# Patient Record
Sex: MALE | Race: WHITE
[De-identification: names, ages, dates, MRNs, and addresses within clinical notes are randomized per-mention and may not be internally consistent; named-entity substitution may affect disease eponyms.]

---

## 2018-11-02 ENCOUNTER — HOSPITAL ENCOUNTER (OUTPATIENT)
Dept: HOSPITAL 47 - LABPAT | Age: 68
Discharge: HOME | End: 2018-11-02
Attending: ORTHOPAEDIC SURGERY
Payer: MEDICARE

## 2018-11-02 DIAGNOSIS — Z01.812: Primary | ICD-10-CM

## 2018-11-02 LAB
ALBUMIN SERPL-MCNC: 3.9 G/DL (ref 3.5–5)
ALP SERPL-CCNC: 65 U/L (ref 38–126)
ALT SERPL-CCNC: 28 U/L (ref 21–72)
ANION GAP SERPL CALC-SCNC: 8 MMOL/L
APTT BLD: 23.6 SEC (ref 22–30)
AST SERPL-CCNC: 23 U/L (ref 17–59)
BUN SERPL-SCNC: 15 MG/DL (ref 9–20)
CALCIUM SPEC-MCNC: 9.4 MG/DL (ref 8.4–10.2)
CHLORIDE SERPL-SCNC: 107 MMOL/L (ref 98–107)
CO2 SERPL-SCNC: 27 MMOL/L (ref 22–30)
ERYTHROCYTE [DISTWIDTH] IN BLOOD BY AUTOMATED COUNT: 4.59 M/UL (ref 4.3–5.9)
ERYTHROCYTE [DISTWIDTH] IN BLOOD: 13.2 % (ref 11.5–15.5)
GLUCOSE SERPL-MCNC: 96 MG/DL (ref 74–99)
HCT VFR BLD AUTO: 43.9 % (ref 39–53)
HGB BLD-MCNC: 15.3 GM/DL (ref 13–17.5)
INR PPP: 1.1 (ref ?–1.2)
MCH RBC QN AUTO: 33.4 PG (ref 25–35)
MCHC RBC AUTO-ENTMCNC: 34.9 G/DL (ref 31–37)
MCV RBC AUTO: 95.8 FL (ref 80–100)
PH UR: 5.5 [PH] (ref 5–8)
PLATELET # BLD AUTO: 207 K/UL (ref 150–450)
POTASSIUM SERPL-SCNC: 4.4 MMOL/L (ref 3.5–5.1)
PROT SERPL-MCNC: 6.2 G/DL (ref 6.3–8.2)
PT BLD: 10.5 SEC (ref 9–12)
SODIUM SERPL-SCNC: 142 MMOL/L (ref 137–145)
SP GR UR: 1.02 (ref 1–1.03)
UROBILINOGEN UR QL STRIP: <2 MG/DL (ref ?–2)
WBC # BLD AUTO: 6.7 K/UL (ref 3.8–10.6)

## 2018-11-02 PROCEDURE — 85027 COMPLETE CBC AUTOMATED: CPT

## 2018-11-02 PROCEDURE — 85610 PROTHROMBIN TIME: CPT

## 2018-11-02 PROCEDURE — 81003 URINALYSIS AUTO W/O SCOPE: CPT

## 2018-11-02 PROCEDURE — 85730 THROMBOPLASTIN TIME PARTIAL: CPT

## 2018-11-02 PROCEDURE — 80053 COMPREHEN METABOLIC PANEL: CPT

## 2018-11-02 PROCEDURE — 87070 CULTURE OTHR SPECIMN AEROBIC: CPT

## 2018-11-02 PROCEDURE — 36415 COLL VENOUS BLD VENIPUNCTURE: CPT

## 2018-11-06 ENCOUNTER — HOSPITAL ENCOUNTER (INPATIENT)
Dept: HOSPITAL 47 - 2ORMAIN | Age: 68
LOS: 2 days | Discharge: HOME HEALTH | DRG: 470 | End: 2018-11-08
Attending: ORTHOPAEDIC SURGERY | Admitting: ORTHOPAEDIC SURGERY
Payer: MEDICARE

## 2018-11-06 VITALS — BODY MASS INDEX: 29.5 KG/M2

## 2018-11-06 DIAGNOSIS — Z87.442: ICD-10-CM

## 2018-11-06 DIAGNOSIS — Z95.1: ICD-10-CM

## 2018-11-06 DIAGNOSIS — H40.9: ICD-10-CM

## 2018-11-06 DIAGNOSIS — G47.30: ICD-10-CM

## 2018-11-06 DIAGNOSIS — Z79.82: ICD-10-CM

## 2018-11-06 DIAGNOSIS — I25.10: ICD-10-CM

## 2018-11-06 DIAGNOSIS — M25.761: ICD-10-CM

## 2018-11-06 DIAGNOSIS — Z79.899: ICD-10-CM

## 2018-11-06 DIAGNOSIS — M17.11: Primary | ICD-10-CM

## 2018-11-06 DIAGNOSIS — I10: ICD-10-CM

## 2018-11-06 DIAGNOSIS — E78.5: ICD-10-CM

## 2018-11-06 DIAGNOSIS — E11.9: ICD-10-CM

## 2018-11-06 DIAGNOSIS — M48.00: ICD-10-CM

## 2018-11-06 PROCEDURE — 0SRC069 REPLACEMENT OF RIGHT KNEE JOINT WITH OXIDIZED ZIRCONIUM ON POLYETHYLENE SYNTHETIC SUBSTITUTE, CEMENTED, OPEN APPROACH: ICD-10-PCS

## 2018-11-06 PROCEDURE — 88300 SURGICAL PATH GROSS: CPT

## 2018-11-06 PROCEDURE — 85025 COMPLETE CBC W/AUTO DIFF WBC: CPT

## 2018-11-06 RX ADMIN — FENTANYL CITRATE PRN MCG: 50 INJECTION, SOLUTION INTRAMUSCULAR; INTRAVENOUS at 17:42

## 2018-11-06 RX ADMIN — FENTANYL CITRATE PRN MCG: 50 INJECTION, SOLUTION INTRAMUSCULAR; INTRAVENOUS at 17:33

## 2018-11-06 RX ADMIN — FENTANYL CITRATE PRN MCG: 50 INJECTION, SOLUTION INTRAMUSCULAR; INTRAVENOUS at 16:56

## 2018-11-06 RX ADMIN — ASPIRIN 325 MG ORAL TABLET SCH MG: 325 PILL ORAL at 21:58

## 2018-11-06 RX ADMIN — DOCUSATE SODIUM AND SENNOSIDES SCH EACH: 50; 8.6 TABLET ORAL at 21:58

## 2018-11-06 RX ADMIN — POTASSIUM CHLORIDE SCH MLS: 14.9 INJECTION, SOLUTION INTRAVENOUS at 12:40

## 2018-11-06 RX ADMIN — CEFAZOLIN SCH MLS: 330 INJECTION, POWDER, FOR SOLUTION INTRAMUSCULAR; INTRAVENOUS at 17:51

## 2018-11-06 RX ADMIN — FENTANYL CITRATE PRN MCG: 50 INJECTION, SOLUTION INTRAMUSCULAR; INTRAVENOUS at 17:12

## 2018-11-06 RX ADMIN — CEFAZOLIN SCH GM: 10 INJECTION, POWDER, FOR SOLUTION INTRAVENOUS at 22:31

## 2018-11-06 NOTE — P.ONQ
Anesthesiology Proc Note - PNB





- Peripheral Nerve Block Performed


  ** Right Adductor Canal Infusion


Time Out Performed: Yes


Procedure Start Time: 13:09


Procedure Stop Time: 13:17


Indication: Acute Post-Operative Pain, Requested by physician


Sedation Type: Sedate with meaningful contact maintained


Preparation: Sterile Dressing


Position: Supine


Catheter: Indwelling


Needle Types: On-Q


Needle Size: 100mm (4")


Needle Gauge: 21


Technique: Ultrasound


Injectate: 0.5% Ropivacaine (see comment for volume) (ropi .5% 20cc)


Blood Aspirated: No


Pain Paresthesia on Injection Noted: No


Resistance on Injection: Normal


Events: Uneventful and Well Tolerated

## 2018-11-06 NOTE — P.OP
Date of Procedure: 11/06/18


Preoperative Diagnosis: 


Severe osteoarthritis right knee


Postoperative Diagnosis: 


Severe osteoarthritis right knee


Procedure(s) Performed: 


Right total knee arthroplasty


Implants: 


Smith and Nephew Journey II CR Oxinium cruciate retaining femoral component 

size 8, right


Smith & Nephew Journey right nonporous tibial baseplate size 7


Smith & Nephew Journey II, XLPE CR articular insert, size 9 mm, Size 7-8 right


Smith & Nephew Journey BCS resurfacing round patellar component, 32 mm


All components were cemented using Palacos R bone cement..


The articulation is Oxinium on polyethylene.


Anesthesia: spinal


Surgeon: Josue Morris


Assistant #1: Lindsay Marlow


Estimated Blood Loss (ml): 50


Pathology: other (Bone and cartilage)


Condition: stable


Disposition: PACU


Indications for Procedure: 


After failure of conservative treatment we discussed the surgical and 

nonsurgical treatment options at length.  Patient wishes to proceed with a 

total knee arthroplasty.  Complications specific to this procedure were 

discussed at length, including but not limited to infection, bleeding, stiffness

, and nerve injury.  Patient is aware of all these complications and informed 

consent was obtained


Operative Findings: 


The operative findings are consistent with severe osteoarthritis of the right 

knee


Description of Procedure: 


Patient was seen in the preoperative area consent was reviewed and operative 

site was marked with a skin marker.  An adductor canal pain catheter was placed 

by anesthesia in the preoperative area.  Patient was then brought to the 

operating room and given preoperative antibiotics intravenously. A spinal 

anesthetic was administered by the anesthesia department.  A tourniquet was 

placed on the upper thigh and the lower extremity was prepped and draped in 

usual sterile fashion.  A gram of transexamic acid was given.  A universal 

timeout was then performed which confirmed the patient's name, surgical site, 

ALLERGIES, and consent.





The lower extremity was then exsanguinated and tourniquet was inflated to 250 

mmHg.  A standard and anterior midline approach to the knee was performed.  The 

skin and subcutaneous tissue was dissected down to the patellar tendon.  A 

medial parapatellar arthrotomy was then performed.  The knee was then extended, 

the patellar was everted, and the knee was again flexed.  Anterior horns of 

both menisci were excised, and a release was performed to the posterior medial 

aspect of the knee.  On gross visual inspection, there was complete loss of 

articular cartilage in the medial and patellofemoral joint spaces.  There was 

also significant cartilage damage in the lateral compartment.  There were 

multiple periarticular osteophytes which were then removed with a Ronguer.  The 

femoral canal was then opened with the appropriate drill, and the 

intramedullary femoral cutting guide was then placed and set for 5 of valgus.  

The distal femoral cutting block was then pinned in place, and the distal femur 

was then cut.  The cutting block was then removed and the cut was checked for 

flatness.  Next, the sizing guide was then placed and set for 3 external 

rotation based off of the epicondylar axis and Whitesides line.  After the 

femur was sized, the appropriate 4-in-1 cutting block was then pinned in place.

  The anterior condyles were cut without notching.  The posterior and chamfer 

cuts were performed while protecting the collateral ligaments.  The cutting 

block was then removed, and the femoral canal was plugged with autologous bone.





Attention was then directed to the tibia.  The remaining ACL was removed with a 

Ronguer, and the tibia was then gently subluxed forward with a large bent knee 

retractor.  Any remaining menisci was excised.  The posterior lateral corner 

was cauterized in order to cauterize the lateral geniculate artery.  The extra 

medullary tibial cutting guide was then placed, set for the appropriate rotation

, slope, and depth of resection.  The proximal tibia cutting guide was then 

pinned in place.  Proximal tibia was then cut and sized.  Next trials were then 

placed with the appropriate-sized insert.  The knee was able to fully extend 

and flex to 130 and was stable throughout all range of motion.  The knee was 

then extended, patella everted.  Patella was then measured, and then using an 

osteotomy guide, the patella was cut at the appropriate level.  The patella was 

then measured and drilled and the patella trial was then placed.  The knee was 

then taken through range of motion with the patella trial and the patella 

tracked normally.  The knee was then extended patella trial was then removed 

and the patella was everted.  Knee was then flexed and lug holes were drilled 

through the femoral trial and the femoral trial was then removed.  The tibial 

was then exposed, and the tibial broach guide was then pinned in place after it 

was set for the appropriate rotation to allow for the most coverage without 

overhang.  The tibia was then reamed and broached.





The cut surfaces of bone were then irrigated with pulsatile lavage.  The 

posterior structures were injected with the ropivacaine solution.  The knee was 

also irrigated with Irrisept solution.  The components were then opened, the 

cement was mixed, and the components were then cemented in place.  The cement 

was allowed to harden with the knee in full extension.  While the cement was 

hardening, the remaining soft tissues were then injected with a ropivacaine 

solution, which consisted of 246.25 mg of ropivacaine, 0.5 mg of epinephrine, 

30 mg of Toradol, 80 g of clonidine, and 48.45 mL of sterile water, for a 

total of 100 mL of fluid injected. After the cemented hardened.  The tourniquet 

was released, and hemostasis was obtained.  A second gram of transexamic acid 

was given.  The knee was again irrigated.  The knee was again taken through 

range of motion and found to be stable throughout all range of motion of 0-130

, and the patella tracked normally.  The fascia was then closed with #2 strata 

fix suture.  The subcutaneous tissue was closed with 3-0 Vicryl and 3-0 strata 

fix.  Dermabond glue was used for the skin and placed with the knee in flexion. 

The patient was placed in a sterile silver dressing.  Patient was then 

transferred to recovery room in stable condition.





The assistant MAYELA De Santiago was required due the complexity surgery and the 

need for a skilled surgical assistant.  She assisted in positioning, draping, 

retraction, and closure of the wound.

## 2018-11-06 NOTE — XR
PROCEDURE: XR knee limited RT 2V

 

DATE AND TIME: 11/6/2018 5:05 PM

 

CLINICAL INDICATION: [facility]

Evaluation for Postop abnormality and alignment

 

TECHNIQUE: Department protocol. 2V

 

COMPARISON: None

 

FINDINGS: TKR is well-seated with anatomic positioning and alignment. No unexpected postoperative fin
dings.

 

IMPRESSION:

Post procedure.

## 2018-11-07 LAB
BASOPHILS # BLD AUTO: 0 K/UL (ref 0–0.2)
BASOPHILS NFR BLD AUTO: 0 %
EOSINOPHIL # BLD AUTO: 0.1 K/UL (ref 0–0.7)
EOSINOPHIL NFR BLD AUTO: 1 %
ERYTHROCYTE [DISTWIDTH] IN BLOOD BY AUTOMATED COUNT: 4.17 M/UL (ref 4.3–5.9)
ERYTHROCYTE [DISTWIDTH] IN BLOOD: 13.2 % (ref 11.5–15.5)
HCT VFR BLD AUTO: 40.6 % (ref 39–53)
HGB BLD-MCNC: 13.7 GM/DL (ref 13–17.5)
LYMPHOCYTES # SPEC AUTO: 1 K/UL (ref 1–4.8)
LYMPHOCYTES NFR SPEC AUTO: 10 %
MCH RBC QN AUTO: 32.9 PG (ref 25–35)
MCHC RBC AUTO-ENTMCNC: 33.8 G/DL (ref 31–37)
MCV RBC AUTO: 97.4 FL (ref 80–100)
MONOCYTES # BLD AUTO: 0.5 K/UL (ref 0–1)
MONOCYTES NFR BLD AUTO: 5 %
NEUTROPHILS # BLD AUTO: 8.8 K/UL (ref 1.3–7.7)
NEUTROPHILS NFR BLD AUTO: 83 %
PLATELET # BLD AUTO: 199 K/UL (ref 150–450)
WBC # BLD AUTO: 10.5 K/UL (ref 3.8–10.6)

## 2018-11-07 RX ADMIN — DOCUSATE SODIUM AND SENNOSIDES SCH EACH: 50; 8.6 TABLET ORAL at 23:03

## 2018-11-07 RX ADMIN — LISINOPRIL SCH MG: 20 TABLET ORAL at 07:57

## 2018-11-07 RX ADMIN — HYDROCODONE BITARTRATE AND ACETAMINOPHEN PRN EACH: 5; 325 TABLET ORAL at 09:44

## 2018-11-07 RX ADMIN — DOXAZOSIN MESYLATE SCH MG: 2 TABLET ORAL at 23:03

## 2018-11-07 RX ADMIN — HYDROCODONE BITARTRATE AND ACETAMINOPHEN PRN EACH: 5; 325 TABLET ORAL at 04:29

## 2018-11-07 RX ADMIN — HYDROCODONE BITARTRATE AND ACETAMINOPHEN PRN EACH: 5; 325 TABLET ORAL at 15:29

## 2018-11-07 RX ADMIN — HYDROCODONE BITARTRATE AND ACETAMINOPHEN PRN EACH: 5; 325 TABLET ORAL at 23:02

## 2018-11-07 RX ADMIN — DOXAZOSIN MESYLATE SCH MG: 2 TABLET ORAL at 08:39

## 2018-11-07 RX ADMIN — POTASSIUM CHLORIDE SCH: 14.9 INJECTION, SOLUTION INTRAVENOUS at 05:09

## 2018-11-07 RX ADMIN — ASPIRIN 325 MG ORAL TABLET SCH MG: 325 PILL ORAL at 23:03

## 2018-11-07 RX ADMIN — HYDROCHLOROTHIAZIDE SCH MG: 12.5 CAPSULE ORAL at 07:57

## 2018-11-07 RX ADMIN — LISINOPRIL SCH MG: 20 TABLET ORAL at 23:03

## 2018-11-07 RX ADMIN — CEFAZOLIN SCH GM: 10 INJECTION, POWDER, FOR SOLUTION INTRAVENOUS at 08:39

## 2018-11-07 RX ADMIN — MELOXICAM SCH MG: 7.5 TABLET ORAL at 07:57

## 2018-11-07 RX ADMIN — ASPIRIN 325 MG ORAL TABLET SCH MG: 325 PILL ORAL at 07:58

## 2018-11-07 RX ADMIN — CEFAZOLIN SCH: 330 INJECTION, POWDER, FOR SOLUTION INTRAMUSCULAR; INTRAVENOUS at 04:06

## 2018-11-07 RX ADMIN — CEFAZOLIN SCH: 330 INJECTION, POWDER, FOR SOLUTION INTRAMUSCULAR; INTRAVENOUS at 18:01

## 2018-11-07 NOTE — P.PN
Progress Note - Text


Progress Note Date: 11/07/18





The patient is status post[ 1] adductor canal catheter placement.  The catheter 

was placed for postoperative pain control, status post total [Right Knee] 

arthroplasty.  Ropivacaine 0.2% is infusing at[5] mLs per hour.  The patient 

has no complaints  of[ right] lower extremity numbness or weakness.  Patient's 

VAS score is[ 2]-10.   Assessment: Patient's adductor canal catheter is in 

place and working appropriately.  Plan: continue infusion and adjust it as 

needed.

## 2018-11-07 NOTE — CONS
CONSULTATION



DATE OF CONSULTATION:

11/06/2018.



REASON FOR CONSULTATION:

Medical management requested by Dr. Morris.



CONSULTATION:

This is a very pleasant 68-year-old patient of Dr. Gilbert Enciso.  The patient has

undergone a right total knee arthroplasty.  Post procedure, had some nausea.  Other

stable medical conditions include osteoarthritis, hypertension.  No chest pain or short

of breath.  Denies any cardiac history.  Somewhat tired post procedure.



REVIEW OF SYSTEMS:

CONSTITUTIONAL:  Tired.

HEENT:  None.

RESPIRATORY none.

GASTROINTESTINAL:  Nausea.

GENITOURINARY: None.

MUSCULOSKELETAL: Arthritic pain in different joints.

DERMATOLOGICAL, HEMATOLOGIC, LYMPHATIC: none.  PSYCHIATRY none.

NEUROLOGICAL none.



PAST MEDICAL HISTORY:

Osteoarthritis, hypertension.



PAST SURGICAL HISTORY:

Back surgery, cervical fusion.



SOCIAL HISTORY:

.  Smoked a pack a day for 30 years.  Stopped in 2003.  Occasional marijuana.

Retired from hospital management.



FAMILY HISTORY:

Of cancer, type unknown.



HOME MEDICATIONS:

1. Norvasc 10 mg a day.

2. Altace 10 mg b.i.d.

3. Naproxen 500 mg p.o. b.i.d.

4. Hydrochlorothiazide 12.5 p.o. daily.

5. Cardura 2 mg p.o. b.i.d.

6. Aspirin 81 mg p.o. daily.



ALLERGIES:

None.



PHYSICAL EXAMINATION:

VITAL SIGNS: Temperature 98.5, pulse 72, respirations 17, blood pressure 147/92, pulse

ox 95 percent on 2 L.

GENERAL APPEARANCE:  Average build, lying in bed, tired appearing.

EYES: Pupils are equal. Conjunctivae normal.

HEENT:  External appearance of nose and ears normal. Oral cavity normal.

NECK: JVD not raised.  Mass not palpable.

RESPIRATORY:  Effort normal.

LUNGS:  Slightly decreased breath sounds.

CARDIOVASCULAR:  1st and 2nd sounds normal.  No edema.

ABDOMEN:  Soft, nontender.  Liver and spleen not palpable.  LYMPHATICS:  No lymph nodes

palpable in the neck and axillae.

PSYCHIATRY: Alert and oriented x3. Mood and affect normal.  NEUROLOGICAL:  Pupils

equal. Cranial nerves grossly intact. Power and sensation grossly intact.

MUSCULOSKELETAL:  Evidence of osteoarthritis especially in the hands.  Dressing over

the right knee.



INVESTIGATIONS:

Blood work from 11/02/2018 shows a white count of 6.7, hemoglobin 15.3, potassium 4.4.

BUN and creatinine is normal.



ASSESSMENT:

1. Right total knee arthroplasty.

2. Primary osteoarthritis.

3. Postop nausea probably side effect of pain medication.

4. Essential hypertension.



PLAN:

Patient is on aspirin 3-5 twice a day.  DVT prophylaxis.  Pain medications in place.

Also getting gentle hydration.  Venodyne boots for DVT prophylaxis also.  The patient

will get antiemetics.  Care was discussed with the patient.  Questions were answered.



Thank you, Dr. Morris.



Copy to Dr. Enciso.





MMFELIPEL / DEBORAHN: [id number] / Job#: [id number]

## 2018-11-08 VITALS
TEMPERATURE: 98 F | DIASTOLIC BLOOD PRESSURE: 88 MMHG | SYSTOLIC BLOOD PRESSURE: 149 MMHG | HEART RATE: 55 BPM | RESPIRATION RATE: 12 BRPM

## 2018-11-08 RX ADMIN — HYDROCODONE BITARTRATE AND ACETAMINOPHEN PRN EACH: 5; 325 TABLET ORAL at 05:14

## 2018-11-08 RX ADMIN — HYDROCODONE BITARTRATE AND ACETAMINOPHEN PRN EACH: 5; 325 TABLET ORAL at 16:37

## 2018-11-08 RX ADMIN — LISINOPRIL SCH MG: 20 TABLET ORAL at 08:33

## 2018-11-08 RX ADMIN — DOXAZOSIN MESYLATE SCH MG: 2 TABLET ORAL at 08:33

## 2018-11-08 RX ADMIN — POTASSIUM CHLORIDE SCH: 14.9 INJECTION, SOLUTION INTRAVENOUS at 05:08

## 2018-11-08 RX ADMIN — CEFAZOLIN SCH: 330 INJECTION, POWDER, FOR SOLUTION INTRAMUSCULAR; INTRAVENOUS at 05:08

## 2018-11-08 RX ADMIN — ASPIRIN 325 MG ORAL TABLET SCH MG: 325 PILL ORAL at 08:33

## 2018-11-08 RX ADMIN — MELOXICAM SCH MG: 7.5 TABLET ORAL at 08:34

## 2018-11-08 RX ADMIN — HYDROCHLOROTHIAZIDE SCH MG: 12.5 CAPSULE ORAL at 08:34

## 2018-11-08 NOTE — P.PN
Subjective





this is a pleasant 69 yo M with pmh of Hypertension who presents for his right 

knee degenerative disease. he is status post right total knee arthroplasaty  

when i saw the pt he was sitting in bed, his pain is controlled, pt denies to 

me chest pain , no dyspnea, no change in urine or bowel habits, no fever, no 

nausea or vomiting. pt tolerated his meal. his vitals looks stable and he is 

saturating 93% on room air,wbc: 10.5K, Hemoglobin: 13.7, Platelets: 199








11/08/2018


Patient is seen and examined by me at bedside.  Patient is doing well after 

surgery.  Patient denies chest pain or dyspnea.  He says his pain is 

controlled.  Didn't complain about bowel movement and urine output.  Patient 

was instructed to follow up with his PCP in one week and he stated that he has 

an appointment with his PCP by the end of November in 2-1/2 weeks.





Objective





- Vital Signs


Vital signs: 


 Vital Signs











Temp  98.1 F   11/08/18 08:07


 


Pulse  74   11/08/18 08:07


 


Resp  16   11/08/18 08:07


 


BP  154/92   11/08/18 08:07


 


Pulse Ox  94 L  11/08/18 08:07








 Intake & Output











 11/07/18 11/08/18 11/08/18





 18:59 06:59 18:59


 


Intake Total 500 1080 


 


Output Total  275 


 


Balance 500 805 


 


Intake:   


 


  Intake, IV Titration 500  





  Amount   


 


    Sodium Chloride 0.9% 1, 500  





    000 ml @ 75 mls/hr IV .   





    [id number] [facility] Rx#:[id number]   


 


  Oral  1080 


 


Output:   


 


  Urine  275 


 


Other:   


 


  Voiding Method  Toilet 





  Urinal 


 


  # Voids 3  














- Exam





GENERAL: The patient is alert and oriented x3, not in any acute distress. Well 

developed, well nourished. 


HEENT: Pupils are round and equally reacting to light. EOMI. No scleral 

icterus. No conjunctival pallor. Normocephalic, atraumatic. No pharyngeal 

erythema. No thyromegaly. 


CARDIOVASCULAR: S1 and S2 present. No murmurs, rubs, or gallops. 


PULMONARY: Chest is clear to auscultation, no wheezing or crackles. 


ABDOMEN: Soft, nontender, nondistended, normoactive bowel sounds. No palpable 

organomegaly.  


MUSCULOSKELETAL: No joint swelling or deformity.


EXTREMITIES: No cyanosis, clubbing, or pedal edema. surgical wound looks clean 

with no surrounding cellulitis, further exam is deferred to the primary 

surgical team.


NEUROLOGICAL: Gross neurological examination did not reveal any focal deficits. 


SKIN: No rashes.





- Labs


CBC & Chem 7: 


 11/07/18 07:01








Assessment and Plan


Assessment: 








This is a pleasant 72 yo M with pmh of Hypertension , hyperlipidemia, 

steoarthritis, Diabetes Mellitus, kidney stones, possible sleep apnea, spinal 

stenois, glaucoma, coronary artery disease status post CABG. he presents for 

his left knee degenerative disease. he is status post left total knee 

arthroplasaty  w


. when i saw the pt he was sitting in bed, his pain is controlled ,, pt denies 

to me chest pain , no dyspnea, no change in urine or bowel habits, no fever, no 

nausea or vomiting. pt tolerated his meal. his vitals looks stable and he is 

saturating 98% on room air,wbc: 9.1K, Hemoglobin: 9.6, Platelets: 248. 

hemoglobin A1c 8.1%





Plan: 


continue with the same treatment , continue with symptomatic treatment , resume 

home medication , monitor lytes and vitals including glucose , c/w iv fluids, 

cardiology consult is appreciated. infectious disease consult is appreciated . c

/w same antibioitc . GI and DVT prophylaxis , further recommendation based upon 

pt clinical course and progress


DVT prophylaxis subcutaneous heparin


GI prophylaxis Pepcid


Patient was instructed to follow up with his PCP in one week after discharge.  

Patient states he has an appointment with his PCP by the end of November and 2 

and half weeks 


Prognosis is guarded





Thank you for consulting us, please feel free to contact us for any further 

question or concerns.

## 2018-11-08 NOTE — P.PN
Progress Note - Text





Anesthesia POD 2.  Patient is status post right TKR under spinal anesthesia 

with a right adductor canal catheter placed for postoperative pain relief.  

With ropivacaine 0.2% running at 10 cc's per hour, the patient's VAS is (1, 3).

  Catheter site is clean dry and intact.

## 2018-12-10 ENCOUNTER — HOSPITAL ENCOUNTER (OUTPATIENT)
Dept: HOSPITAL 47 - LABWHC1 | Age: 68
End: 2018-12-10
Attending: ORTHOPAEDIC SURGERY
Payer: MEDICARE

## 2018-12-10 DIAGNOSIS — Z96.651: ICD-10-CM

## 2018-12-10 DIAGNOSIS — M25.561: Primary | ICD-10-CM

## 2018-12-10 LAB
BASOPHILS # BLD AUTO: 0 K/UL (ref 0–0.2)
BASOPHILS NFR BLD AUTO: 0 %
CELL CNT PNL FLD: 100
DEPRECATED POLYS # FLD: 97 %
EOSINOPHIL # BLD AUTO: 0.3 K/UL (ref 0–0.7)
EOSINOPHIL NFR BLD AUTO: 1 %
ERYTHROCYTE [DISTWIDTH] IN BLOOD BY AUTOMATED COUNT: 4.31 M/UL (ref 4.3–5.9)
ERYTHROCYTE [DISTWIDTH] IN BLOOD: 13.1 % (ref 11.5–15.5)
HCT VFR BLD AUTO: 41.6 % (ref 39–53)
HGB BLD-MCNC: 13.9 GM/DL (ref 13–17.5)
LYMPHOCYTES # SPEC AUTO: 0.9 K/UL (ref 1–4.8)
LYMPHOCYTES NFR SPEC AUTO: 4 %
MCH RBC QN AUTO: 32.3 PG (ref 25–35)
MCHC RBC AUTO-ENTMCNC: 33.5 G/DL (ref 31–37)
MCV RBC AUTO: 96.5 FL (ref 80–100)
MONOCYTES # BLD AUTO: 0.8 K/UL (ref 0–1)
MONOCYTES NFR BLD AUTO: 4 %
MONONUC CELLS # FLD: 1 %
NEUTROPHILS # BLD AUTO: 18.9 K/UL (ref 1.3–7.7)
NEUTROPHILS NFR BLD AUTO: 90 %
NUC CELL # FLD: (no result) /UL
PLATELET # BLD AUTO: 164 K/UL (ref 150–450)
WBC # BLD AUTO: 21.1 K/UL (ref 3.8–10.6)

## 2018-12-10 PROCEDURE — 86140 C-REACTIVE PROTEIN: CPT

## 2018-12-10 PROCEDURE — 87075 CULTR BACTERIA EXCEPT BLOOD: CPT

## 2018-12-10 PROCEDURE — 85652 RBC SED RATE AUTOMATED: CPT

## 2018-12-10 PROCEDURE — 87070 CULTURE OTHR SPECIMN AEROBIC: CPT

## 2018-12-10 PROCEDURE — 85025 COMPLETE CBC W/AUTO DIFF WBC: CPT

## 2018-12-10 PROCEDURE — 87205 SMEAR GRAM STAIN: CPT

## 2018-12-10 PROCEDURE — 89060 EXAM SYNOVIAL FLUID CRYSTALS: CPT

## 2018-12-10 PROCEDURE — 36415 COLL VENOUS BLD VENIPUNCTURE: CPT

## 2018-12-10 PROCEDURE — 89050 BODY FLUID CELL COUNT: CPT

## 2018-12-11 ENCOUNTER — HOSPITAL ENCOUNTER (INPATIENT)
Dept: HOSPITAL 47 - ORWHC2ENDO | Age: 68
LOS: 3 days | Discharge: HOME HEALTH | DRG: 486 | End: 2018-12-14
Attending: ORTHOPAEDIC SURGERY | Admitting: ORTHOPAEDIC SURGERY
Payer: MEDICARE

## 2018-12-11 VITALS — BODY MASS INDEX: 29.8 KG/M2

## 2018-12-11 DIAGNOSIS — Z87.891: ICD-10-CM

## 2018-12-11 DIAGNOSIS — B95.8: ICD-10-CM

## 2018-12-11 DIAGNOSIS — I10: ICD-10-CM

## 2018-12-11 DIAGNOSIS — D62: ICD-10-CM

## 2018-12-11 DIAGNOSIS — M19.91: ICD-10-CM

## 2018-12-11 DIAGNOSIS — Z79.899: ICD-10-CM

## 2018-12-11 DIAGNOSIS — T84.53XA: Primary | ICD-10-CM

## 2018-12-11 DIAGNOSIS — Z79.82: ICD-10-CM

## 2018-12-11 DIAGNOSIS — E66.9: ICD-10-CM
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
## 2018-12-11 DIAGNOSIS — Z98.1: ICD-10-CM

## 2018-12-11 PROCEDURE — 77001 FLUOROGUIDE FOR VEIN DEVICE: CPT

## 2018-12-11 PROCEDURE — 0SPC09Z REMOVAL OF LINER FROM RIGHT KNEE JOINT, OPEN APPROACH: ICD-10-PCS

## 2018-12-11 PROCEDURE — 87205 SMEAR GRAM STAIN: CPT

## 2018-12-11 PROCEDURE — 94760 N-INVAS EAR/PLS OXIMETRY 1: CPT

## 2018-12-11 PROCEDURE — 0SUV09Z SUPPLEMENT RIGHT KNEE JOINT, TIBIAL SURFACE WITH LINER, OPEN APPROACH: ICD-10-PCS

## 2018-12-11 PROCEDURE — 87075 CULTR BACTERIA EXCEPT BLOOD: CPT

## 2018-12-11 PROCEDURE — 87077 CULTURE AEROBIC IDENTIFY: CPT

## 2018-12-11 PROCEDURE — 85025 COMPLETE CBC W/AUTO DIFF WBC: CPT

## 2018-12-11 PROCEDURE — 76937 US GUIDE VASCULAR ACCESS: CPT

## 2018-12-11 PROCEDURE — 80202 ASSAY OF VANCOMYCIN: CPT

## 2018-12-11 PROCEDURE — 36569 INSJ PICC 5 YR+ W/O IMAGING: CPT

## 2018-12-11 PROCEDURE — 80048 BASIC METABOLIC PNL TOTAL CA: CPT

## 2018-12-11 PROCEDURE — 87186 SC STD MICRODIL/AGAR DIL: CPT

## 2018-12-11 PROCEDURE — 85610 PROTHROMBIN TIME: CPT

## 2018-12-11 PROCEDURE — 87070 CULTURE OTHR SPECIMN AEROBIC: CPT

## 2018-12-11 RX ADMIN — HYDROCODONE BITARTRATE AND ACETAMINOPHEN PRN EACH: 7.5; 325 TABLET ORAL at 23:24

## 2018-12-11 RX ADMIN — DOCUSATE SODIUM AND SENNOSIDES SCH EACH: 50; 8.6 TABLET ORAL at 23:25

## 2018-12-11 RX ADMIN — HYDROMORPHONE HYDROCHLORIDE ONE MG: 1 INJECTION, SOLUTION INTRAMUSCULAR; INTRAVENOUS; SUBCUTANEOUS at 18:25

## 2018-12-11 RX ADMIN — ONDANSETRON ONE MG: 2 INJECTION INTRAMUSCULAR; INTRAVENOUS at 18:51

## 2018-12-11 RX ADMIN — MIDAZOLAM ONE MG: 1 INJECTION INTRAMUSCULAR; INTRAVENOUS at 18:31

## 2018-12-11 RX ADMIN — HYDROMORPHONE HYDROCHLORIDE ONE MG: 1 INJECTION, SOLUTION INTRAMUSCULAR; INTRAVENOUS; SUBCUTANEOUS at 18:05

## 2018-12-11 RX ADMIN — HYDROMORPHONE HYDROCHLORIDE ONE MG: 1 INJECTION, SOLUTION INTRAMUSCULAR; INTRAVENOUS; SUBCUTANEOUS at 18:11

## 2018-12-11 RX ADMIN — ONDANSETRON ONE MG: 2 INJECTION INTRAMUSCULAR; INTRAVENOUS at 14:17

## 2018-12-11 RX ADMIN — MIDAZOLAM ONE MG: 1 INJECTION INTRAMUSCULAR; INTRAVENOUS at 14:58

## 2018-12-11 RX ADMIN — MIDAZOLAM ONE MG: 1 INJECTION INTRAMUSCULAR; INTRAVENOUS at 18:16

## 2018-12-11 RX ADMIN — CEFAZOLIN SCH GM: 10 INJECTION, POWDER, FOR SOLUTION INTRAVENOUS at 23:24

## 2018-12-11 RX ADMIN — ASPIRIN 325 MG ORAL TABLET SCH MG: 325 PILL ORAL at 23:24

## 2018-12-11 RX ADMIN — HYDROMORPHONE HYDROCHLORIDE ONE MG: 1 INJECTION, SOLUTION INTRAMUSCULAR; INTRAVENOUS; SUBCUTANEOUS at 18:56

## 2018-12-11 NOTE — P.HPOR
History of Present Illness


H&P Date: 12/11/18


This is a 68-year-old male who is status post right total knee arthroplasty on 

11/06/2018. The patient presented as an outpatient for follow up on 12/10/2018 

and complained of swelling and mild drainage in the right knee. The patient's 

knee was aspirated and fluid was sent to the lab for analysis and culture. The 

patient was scheduled for an incision and drainage of the right knee and the 

patient was sent for routine blood work. Patient denies fever, but did admit to 

chills. Patient denies any numbness, weakness, tingling, abdominal pain, 

shortness of breath or chest pain.








Review of Systems


See HPI.








Past Medical History


Past Medical History: Hypertension


History of Any Multi-Drug Resistant Organisms: None Reported


Past Surgical History: Back Surgery


Additional Past Surgical History / Comment(s): lumbar coflex implant 07/1918, 

cervical fusion


Past Anesthesia/Blood Transfusion Reactions: No Reported Reaction


Past Psychological History: No Psychological Hx Reported


Smoking Status: Former smoker


Past Alcohol Use History: Occasional


Additional Past Alcohol Use History / Comment(s): quit smoking approx 2003, 

smoked 1ppd from 20's


Past Drug Use History: Marijuana


Additional Drug Use History / Comment(s): occasional use, instructed to hold 

24hrs prior to procedure





- Past Family History


  ** Mother


Family Medical History: Cancer





Medications and Allergies


 Home Medications











 Medication  Instructions  Recorded  Confirmed  Type


 


Aspirin [Adult Low Dose Aspirin EC] 81 mg PO DAILY 10/30/18 11/06/18 History


 


Doxazosin [Cardura] 2 mg PO BID 10/30/18 11/06/18 History


 


Hydrochlorothiazide 12.5 mg PO QAM 10/30/18 11/06/18 History


 


Naproxen 500 mg PO BID 10/30/18 11/06/18 History


 


Ramipril [Altace] 10 mg PO BID 10/30/18 11/06/18 History


 


amLODIPine [Norvasc] 10 mg PO QAM 10/30/18 11/06/18 History


 


Aspirin 325 mg PO BID #60 tab 11/07/18  Rx


 


Sennosides [Senokot] 1 tab PO BID #60 tablet 11/07/18  Rx


 


HYDROcodone/APAP 7.5-325MG [Norco 1 - 2 tab PO Q4-6H PRN #84 tab 11/08/18  Rx





7.5-325]    











 Allergies











Allergy/AdvReac Type Severity Reaction Status Date / Time


 


No Known Allergies Allergy   Verified 11/06/18 16:49














Physical Examination


On exam patient is well-appearing.  There is swelling of the right knee.  There 

is mild drainage from the incision.  Patient has limited range of motion of the 

right knee due to pain and swelling.  Calf is soft and nontender to palpation.  

Sensation is intact.  Neurovascular status and circulatory status are intact.








Assessment and Plan


(1) S/P total knee arthroplasty


Current Visit: No   Status: Acute   Code(s): Z96.659 - PRESENCE OF UNSPECIFIED 

ARTIFICIAL KNEE JOINT   SNOMED Code(s): 9943075886382


   





(2) Infection of total right knee replacement


Current Visit: Yes   Status: Acute   Code(s): T84.53XA - INFECT/INFLM REACTION 

DUE TO INTERNAL R KNEE PROSTH, INIT   SNOMED Code(s): 153283393


   


Plan: 


1. Cultures are pending.


2.  CBC shows elevated white count and synovial fluid analysis shows evidence 

for infection.


3.  Will consult infectious disease postoperatively.


4.  Incision and drainage of the right knee is scheduled for today in the 

operating room.

## 2018-12-11 NOTE — XR
PROCEDURE: XR knee limited RT - 2V

DATE AND TIME: 12/11/2018 7:12 PM

 

CLINICAL INDICATION: [facility]; Evaluation for Postop abnormality and alignment

 

TECHNIQUE: Department protocol

 

COMPARISON: None

 

FINDINGS: The TKR appears anatomic in its positioning and alignment. Postprocedural changes are noted
. 

 

IMPRESSION:

Postoperative AP and crosstable lateral views.

## 2018-12-11 NOTE — P.OP
Date of Procedure: 12/11/18


Preoperative Diagnosis: 


Infection right total knee arthroplasty


Postoperative Diagnosis: 


Infection right total knee arthroplasty


Procedure(s) Performed: 


Irrigation and debridement of the right total knee with polyethylene exchange.


Implants: 


Smith & Nephew Journey II, XLPE CR articular insert, size 9 mm, Size 7-8 right


Anesthesia: GETA


Surgeon: Josue Morris


Assistant #1: Lindsay Marlow


Estimated Blood Loss (ml): 100


Pathology: other (Cultures 2)


Condition: stable


Disposition: PACU


Indications for Procedure: 


This is a 60-year-old gentleman has had a right total knee arthroplasty 

performed by myself approximately 1 month ago.  He was initially doing well and 

then presented yesterday to the office with increased pain and swelling and 

drainage from his right knee.  An aspiration was performed confirmed an 

infection in his right total knee after discussing the surgical nonsurgical 

treatment options with him at length, I recommended an urgent irrigation and 

debridement and polyethylene exchange of his right knee in order to attempt to 

save the components.  He is agreeable to this informed consent was obtained.


Operative Findings: 


The operative findings show an infection in the pre-patellar space and the 

knee.  The deep knee did not appear to be infected..


Description of Procedure: 


Patient was seen in the preoperative area consent was reviewed and operative 

site was marked with a skin marker.  Patient was then brought to the operating 

room and given preoperative antibiotics intravenously. A general anesthetic was 

administered by the anesthesia department.  A tourniquet was placed on the 

upper thigh and the lower extremity was prepped and draped in usual sterile 

fashion.   A universal timeout was then performed which confirmed the patient's 

name, surgical site, ALLERGIES, and consent.





 A standard and anterior midline approach to the knee was performed, with the 

prior scar being excised..  The skin and subcutaneous tissue was dissected down 

to the patellar tendon.  Upon entry through the skin and a large amount of 

purulent fluid was encountered.  This was cultured 2.  The prepatellar area 

was then extensively irrigated and any suspicious tissue was removed.  After 

thorough irrigation and debridement of the prepatellar area, A medial 

parapatellar arthrotomy was then performed.  A moderate amount of clear fluid 

was encountered and this was cultured.  This fluid appeared was normal in 

appearance, and there was no signs of any purulent material in the deep knee 

joint.  The knee was then extended, the patellar was everted, and the knee was 

again flexed.  The polyethylene was then easily removed.  The components were 

evaluated and found to be well fixed to the bone.  The knee was then copiously 

irrigated with pulsatile lavage.  Next a new polyethylene was inserted with 

component locking confirmed. The knee was also irrigated with Irrisept 

solution.   The knee was again taken through range of motion and found to be 

stable throughout all range of motion of 0-130, and the patella tracked 

normally.  The fascia was then closed with #2 strata fix suture.  The 

subcutaneous tissue was closed with 3-0 Vicryl and staples. The patient was 

placed in a sterile silver dressing.  Patient was then transferred to recovery 

room in stable condition.





The assistant MAYELA De Santiago was required due the complexity surgery and the 

need for a skilled surgical assistant.  She assisted in positioning, draping, 

retraction, and closure of the wound.

## 2018-12-12 LAB
ANION GAP SERPL CALC-SCNC: 8 MMOL/L
BASOPHILS # BLD AUTO: 0.1 K/UL (ref 0–0.2)
BASOPHILS NFR BLD AUTO: 0 %
BUN SERPL-SCNC: 17 MG/DL (ref 9–20)
CALCIUM SPEC-MCNC: 9.3 MG/DL (ref 8.4–10.2)
CHLORIDE SERPL-SCNC: 102 MMOL/L (ref 98–107)
CO2 SERPL-SCNC: 26 MMOL/L (ref 22–30)
EOSINOPHIL # BLD AUTO: 0.4 K/UL (ref 0–0.7)
EOSINOPHIL NFR BLD AUTO: 3 %
ERYTHROCYTE [DISTWIDTH] IN BLOOD BY AUTOMATED COUNT: 3.66 M/UL (ref 4.3–5.9)
ERYTHROCYTE [DISTWIDTH] IN BLOOD: 13 % (ref 11.5–15.5)
GLUCOSE SERPL-MCNC: 115 MG/DL (ref 74–99)
HCT VFR BLD AUTO: 35.3 % (ref 39–53)
HGB BLD-MCNC: 11.9 GM/DL (ref 13–17.5)
LYMPHOCYTES # SPEC AUTO: 1 K/UL (ref 1–4.8)
LYMPHOCYTES NFR SPEC AUTO: 8 %
MCH RBC QN AUTO: 32.7 PG (ref 25–35)
MCHC RBC AUTO-ENTMCNC: 33.9 G/DL (ref 31–37)
MCV RBC AUTO: 96.4 FL (ref 80–100)
MONOCYTES # BLD AUTO: 0.8 K/UL (ref 0–1)
MONOCYTES NFR BLD AUTO: 6 %
NEUTROPHILS # BLD AUTO: 10.6 K/UL (ref 1.3–7.7)
NEUTROPHILS NFR BLD AUTO: 81 %
PLATELET # BLD AUTO: 176 K/UL (ref 150–450)
POTASSIUM SERPL-SCNC: 3.9 MMOL/L (ref 3.5–5.1)
SODIUM SERPL-SCNC: 136 MMOL/L (ref 137–145)
WBC # BLD AUTO: 13.1 K/UL (ref 3.8–10.6)

## 2018-12-12 RX ADMIN — CEFAZOLIN SCH MLS/HR: 330 INJECTION, POWDER, FOR SOLUTION INTRAMUSCULAR; INTRAVENOUS at 04:45

## 2018-12-12 RX ADMIN — CEFAZOLIN SCH GM: 10 INJECTION, POWDER, FOR SOLUTION INTRAVENOUS at 09:48

## 2018-12-12 RX ADMIN — ASPIRIN 325 MG ORAL TABLET SCH MG: 325 PILL ORAL at 21:33

## 2018-12-12 RX ADMIN — CEFAZOLIN SCH MLS/HR: 330 INJECTION, POWDER, FOR SOLUTION INTRAMUSCULAR; INTRAVENOUS at 09:49

## 2018-12-12 RX ADMIN — DOXAZOSIN MESYLATE SCH MG: 2 TABLET ORAL at 21:33

## 2018-12-12 RX ADMIN — DOCUSATE SODIUM AND SENNOSIDES SCH EACH: 50; 8.6 TABLET ORAL at 21:33

## 2018-12-12 RX ADMIN — LISINOPRIL SCH MG: 20 TABLET ORAL at 21:33

## 2018-12-12 RX ADMIN — HYDROCODONE BITARTRATE AND ACETAMINOPHEN PRN EACH: 7.5; 325 TABLET ORAL at 04:44

## 2018-12-12 RX ADMIN — HYDROCODONE BITARTRATE AND ACETAMINOPHEN PRN EACH: 7.5; 325 TABLET ORAL at 23:13

## 2018-12-12 RX ADMIN — LISINOPRIL SCH MG: 20 TABLET ORAL at 15:34

## 2018-12-12 RX ADMIN — HYDROCODONE BITARTRATE AND ACETAMINOPHEN PRN EACH: 7.5; 325 TABLET ORAL at 17:12

## 2018-12-12 RX ADMIN — MELOXICAM SCH MG: 7.5 TABLET ORAL at 09:47

## 2018-12-12 RX ADMIN — CEFAZOLIN SCH MLS/HR: 330 INJECTION, POWDER, FOR SOLUTION INTRAMUSCULAR; INTRAVENOUS at 17:13

## 2018-12-12 RX ADMIN — ASPIRIN 325 MG ORAL TABLET SCH MG: 325 PILL ORAL at 09:47

## 2018-12-12 NOTE — P.CONS
History of Present Illness





- Reason for Consult


Consult date: 12/12/18


Medical management of hypertension





- Chief Complaint


Right knee pain





- History of Present Illness





Patient is a 68-year-old male with a known history of hypertension and history 

of right total knee arthroplasty done on 11/06/2018 who underwent I&D with 

polyethylene exchange right knee on 12 11 2018 was admitted to the hospital for 

I&D of the right knee.  Patient had right total knee arthroplasty November and 

since then she is doing very well but suddenly on Wednesday he noticed 

increased pain in the right knee.  Denied any trauma.  Next day was having 

increased knee swelling and continued pain, patient presented to Dr. Desir's 

office on underwent aspiration on December 10.  Patient was admitted to 

hospital on 12/11/2018 for I&D with polyethylene exchange.  Patient was found 

to have infected prepatellar space and knee.





Currently patient denied any complaints of chest pain or shortness of breath.  

No headache or dizziness or lightheadedness.  No nausea vomiting or abdominal 

pain.  No worsening right knee pain.  Patient says that right knee pain is 

improved with with I&D.


Wound cultures are currently pending at this time.


WBC 13.1, WBC was 21 2 days ago and sed rate 15 CRP 27











Review of Systems





Constitutional: Patient denies any fever or chills .  No generalized weakness 

or weight loss.  


Abdomen: Patient denied nausea vomiting and diarrhea and abdominal pain.


Cardiovascular: Patient denies any chest pain or short of breath no 

palpitations.


Respiratory: patient denied any cough is from production.  No shortness of 

breath


Neurologic: Patient denied any numbness or tingling headache.


Musculoskeletal: Patient denies any complaints of joint swelling or deformity.  

Right knee pain and swelling


Skin: Negative


Psychiatric: Negative


Endocrine: No heat or cold intolerance.  No recent weight gain.


Genitourinary: No dysuria or hematuria.


All other 14 point ROS negative except the above





Past Medical History


Past Medical History: Hypertension


History of Any Multi-Drug Resistant Organisms: None Reported


Past Surgical History: Back Surgery


Additional Past Surgical History / Comment(s): lumbar coflex implant 07/19/18, 

cervical fusion


Past Anesthesia/Blood Transfusion Reactions: No Reported Reaction


Past Psychological History: No Psychological Hx Reported


Smoking Status: Former smoker


Past Alcohol Use History: Occasional


Additional Past Alcohol Use History / Comment(s): quit smoking approx 2003, 

smoked 1ppd from 20's


Past Drug Use History: Marijuana


Additional Drug Use History / Comment(s): occasional use, instructed to hold 

24hrs prior to procedure





- Past Family History


  ** Mother


Family Medical History: Cancer





Medications and Allergies


 Home Medications











 Medication  Instructions  Recorded  Confirmed  Type


 


Aspirin [Adult Low Dose Aspirin EC] 81 mg PO DAILY 10/30/18 12/11/18 History


 


Doxazosin [Cardura] 2 mg PO BID 10/30/18 12/11/18 History


 


Hydrochlorothiazide 12.5 mg PO QAM 10/30/18 12/11/18 History


 


Naproxen 500 mg PO BID 10/30/18 12/11/18 History


 


Ramipril [Altace] 10 mg PO BID 10/30/18 12/12/18 History


 


amLODIPine [Norvasc] 10 mg PO QAM 10/30/18 12/11/18 History


 


Aspirin 325 mg PO BID #60 tab 11/07/18 12/11/18 Rx


 


HYDROcodone/APAP 7.5-325MG [Norco 1 - 2 tab PO Q4-6H PRN #84 tab 11/08/18 12/11/ 18 Rx





7.5-325]    


 


Sennosides [Senokot] 8.6 mg PO BID 12/11/18 12/11/18 History











 Allergies











Allergy/AdvReac Type Severity Reaction Status Date / Time


 


No Known Allergies Allergy   Verified 12/11/18 13:49














Physical Exam


Vitals: 


 Vital Signs











  Temp Pulse Pulse Pulse Resp BP Pulse Ox


 


 12/12/18 07:00  98.9 F    88  18  131/71 


 


 12/11/18 23:00   81     119/72  94 L


 


 12/11/18 22:30   72     109/58 


 


 12/11/18 22:00   83     115/59 


 


 12/11/18 21:45   78     113/57 


 


 12/11/18 21:30   76     107/55 


 


 12/11/18 21:15   71     105/58 


 


 12/11/18 21:00   79     112/58 


 


 12/11/18 20:45   77     113/63 


 


 12/11/18 20:36        96


 


 12/11/18 20:30  98.3 F  84    15  119/69  94 L


 


 12/11/18 20:15   83    16  125/70  96


 


 12/11/18 19:44   90    16  130/71  98


 


 12/11/18 19:30   77    16  114/59  97


 


 12/11/18 19:15   91    16  119/65  97


 


 12/11/18 19:00   86    16  126/72  97


 


 12/11/18 18:45   86    18  114/66  97


 


 12/11/18 18:31   80    18  121/63  97


 


 12/11/18 18:16   86    18  133/78  97


 


 12/11/18 18:01  97.9 F  92    18  130/66  97


 


 12/11/18 14:06  97.5 F L   54 L   16  171/80  97








 Intake and Output











 12/11/18 12/12/18 12/12/18





 22:59 06:59 14:59


 


Intake Total 1101  220


 


Output Total 100 175 


 


Balance 1001 -175 220


 


Intake:   


 


  IV 1101  


 


  Oral   220


 


Output:   


 


  Urine  175 


 


  Estimated Blood Loss 100  


 


Other:   


 


  Weight 99.79 kg  














PHYSICAL EXAMINATION: 


Patient is lying in the bed comfortably, no acute distress, awake alert and 

oriented.. 


HEENT: Normocephalic. Neck is supple. Pupils reactive. Nostrils clear. Oral 

cavity is moist. Ears reveal no drainage. 


Neck reveals no JVD, carotid bruits, or thyromegaly. 


CHEST EXAMINATION: Trachea is central. Symmetrical expansion. Lung fields clear 

to auscultation and percussion. 


CARDIAC: Normal S1, S2 with no gallops. No murmurs 


ABDOMEN: Soft. Bowel sounds normal. No organomegaly. No abdominal bruits. 


Extremities: reveal no edema.  No clubbing or cyanosis


Neurologically awake, alert, oriented x3 with well-coordinated movements.  No 

focal deficits noted


Skin: No rash or skin lesions. 


Psychiatric: Coperative.  Nonsuicidal


Musculoskeletal: Right knee I&D site is bandaged.  Decreased range of motion..








Results


CBC & Chem 7: 


 12/12/18 07:40





 12/12/18 16:11


Labs: 


 Abnormal Lab Results - Last 24 Hours (Table)











  12/12/18 Range/Units





  07:40 


 


WBC  13.1 H  (3.8-10.6)  k/uL


 


RBC  3.66 L  (4.30-5.90)  m/uL


 


Hgb  11.9 L  (13.0-17.5)  gm/dL


 


Hct  35.3 L  (39.0-53.0)  %


 


Neutrophils #  10.6 H  (1.3-7.7)  k/uL








 Microbiology - Last 24 Hours (Table)











 12/11/18 17:15 Gram Stain - Preliminary





 Knee - Right Wound Culture - Preliminary


 


 12/11/18 17:15 Gram Stain - Preliminary





 Knee - Right Wound Culture - Preliminary


 


 12/11/18 17:15 Anaerobic Culture - Preliminary





 Knee - Right 


 


 12/11/18 17:15 Anaerobic Culture - Preliminary





 Knee - Right 














Assessment and Plan


Assessment: 





Right knee arthroplasty infection status post irrigation and debridement of the 

right total knee with polyethylene exchange.  On 12/11/2018


History of right total knee arthroplasty on 11/06/2018


Hypertension controlled


History of back surgery and chronic back pain


Previous history of smoking


DVT prophylaxis





Plan:


Patient will be continued on pain management.  Follow up wound cultures.  

Patient was started on vancomycin with aspiration of the right knee fluid 

cultures growing presumptive staph aureus.  ID is on board.  We will continue 

with home blood pressure medications in the form of Norvasc, Ramipril and HCTZ. 


Continue to follow closely and further recommendations based on the clinical 

course.





Thank you for your consult.





Time with Patient: Greater than 30

## 2018-12-12 NOTE — P.CONS
History of Present Illness





- Reason for Consult


Consult date: 12/12/18


Infected right total knee





- History of Present Illness





This is a 68-year-old  male who recently underwent a right total knee 

arthroplasty on November 6 with Dr. Morris.  Patient states he did very well 

following the surgery and was progressing well with good range of motion.  He 

states that on Wednesday he noticed that he had significant pain in the knee 

which kept him up all night.  During the day he was not having this type of 

pain.  On the next day he felt moisture around the knee but not sure if it was 

coming from the wound.  He states he also had increased swelling and pain that 

continued to worsen.  Patient followed up with Dr. Morris and he underwent an 

aspiration on December 10 of synovial fluid which was orange and cloudy, RBCs 58

,500, WBCs 110,100, polynuclear cells 97, mononuclear 1, eosinophils 2.  

Specimen was negative for crystals.  At that time, his serum white count was 21

, sed rate 15 and CRP 27.  Patient was admitted to [facility] 

and yesterday underwent I&D with polyethylene exchange with Dr. Morris.  He 

found infected prepatellar space and knee but deep knee did not appear 

infected.  Patient states that his pain is improved since he underwent I&D.  He 

has received Kefzol in the perioperative period.  Patient denies any trauma or 

fall that would have injured his knee.





Review of Systems


All systems: negative


Constitutional: Reports poor appetite, Denies anorexia, Denies chills, Denies 

fatigue, Denies fever, Denies lethargy, Denies malaise, Denies sweats, Denies 

weakness, Denies weight loss


Eyes: denies blurred vision, denies pain


Ears, nose, mouth and throat: Denies dysphagia, Denies headache, Denies mouth 

pain, Denies sore throat, Denies vertigo


Cardiovascular: Denies chest pain, Denies dyspnea on exertion, Denies edema, 

Denies lightheadedness, Denies shortness of breath, Denies syncope


Respiratory: Denies cough, Denies cough with sputum, Denies dyspnea, Denies 

excessive sputum, Denies hemoptysis, Denies home oxygen, Denies wheezing


Gastrointestinal: Reports loss of appetite, Denies abdominal pain, Denies 

diarrhea, Denies nausea, Denies vomiting


Musculoskeletal: Denies frequent falls, Denies gait dysfunction, Denies muscle 

cramps, Denies muscle weakness, Denies myalgias


Musculoskeletal: right: knee pain, knee stiffness, knee swelling


Integumentary: Reports wounds, Denies pruritus, Denies rash


Neurological: Reports gait dysfunction, Denies aphasia, Denies change in 

mentation, Denies confusion, Denies head injury, Denies headaches, Denies 

numbness, Denies seizures, Denies weakness


Psychiatric: Denies anxiety, Denies depression


Endocrine: Denies fatigue, Denies weight change





Past Medical History


Past Medical History: Hypertension


History of Any Multi-Drug Resistant Organisms: None Reported


Past Surgical History: Appendectomy, Back Surgery, Hernia Repair


Additional Past Surgical History / Comment(s): lumbar coflex implant 07/19/18, 

cervical fusion, right total knee arthroplasty 11/06/2018, I&D with 

polyethylene exchange right knee 12/11/2018


Past Anesthesia/Blood Transfusion Reactions: No Reported Reaction


Past Psychological History: No Psychological Hx Reported


Smoking Status: Former smoker


Past Alcohol Use History: Occasional


Additional Past Alcohol Use History / Comment(s): quit smoking approx 2003, 

smoked 1ppd from 20's.  Patient denied marijuana or illicit drug use.  He 

states he drinks 2 "good" glasses of bourbon a few days every week.  He lives 

at home with his wife.  He denies any pets.  He is retired from hospital 

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  Patient 

served 2 years in the [employer] and was stationed in [place].


Past Drug Use History: Marijuana


Additional Drug Use History / Comment(s): occasional use, instructed to hold 

24hrs prior to procedure





- Past Family History


  ** Mother


Family Medical History: Cancer





Medications and Allergies


 Home Medications











 Medication  Instructions  Recorded  Confirmed  Type


 


Aspirin [Adult Low Dose Aspirin EC] 81 mg PO DAILY 10/30/18 12/11/18 History


 


Doxazosin [Cardura] 2 mg PO BID 10/30/18 12/11/18 History


 


Hydrochlorothiazide 12.5 mg PO QAM 10/30/18 12/11/18 History


 


Naproxen 500 mg PO BID 10/30/18 12/11/18 History


 


Ramipril [Altace] 10 mg PO BID 10/30/18 12/12/18 History


 


amLODIPine [Norvasc] 10 mg PO QAM 10/30/18 12/11/18 History


 


Aspirin 325 mg PO BID #60 tab 11/07/18 12/11/18 Rx


 


HYDROcodone/APAP 7.5-325MG [Norco 1 - 2 tab PO Q4-6H PRN #84 tab 11/08/18 12/11/ 18 Rx





7.5-325]    


 


Sennosides [Senokot] 8.6 mg PO BID 12/11/18 12/11/18 History


 


Aspirin 325 mg PO BID #60 tab 12/14/18  Rx


 


HYDROcodone/APAP 7.5-325MG [Norco 1 - 2 tab PO Q4-6H PRN #84 tab 12/14/18  Rx





7.5-325]    


 


Sennosides [Senokot] 1 tab PO BID #60 tablet 12/14/18  Rx


 


cefTRIAXone [Rocephin] 2,000 mg IVPB Q24HR #42 vial 12/14/18  Rx











 Allergies











Allergy/AdvReac Type Severity Reaction Status Date / Time


 


No Known Allergies Allergy   Verified 12/11/18 13:49














Physical Exam


Vitals: 


 Vital Signs











  Temp Pulse Pulse Pulse Resp BP Pulse Ox


 


 12/12/18 07:00  98.9 F    88  18  131/71 


 


 12/11/18 23:00   81     119/72  94 L


 


 12/11/18 22:30   72     109/58 


 


 12/11/18 22:00   83     115/59 


 


 12/11/18 21:45   78     113/57 


 


 12/11/18 21:30   76     107/55 


 


 12/11/18 21:15   71     105/58 


 


 12/11/18 21:00   79     112/58 


 


 12/11/18 20:45   77     113/63 


 


 12/11/18 20:36        96


 


 12/11/18 20:30  98.3 F  84    15  119/69  94 L


 


 12/11/18 20:15   83    16  125/70  96


 


 12/11/18 19:44   90    16  130/71  98


 


 12/11/18 19:30   77    16  114/59  97


 


 12/11/18 19:15   91    16  119/65  97


 


 12/11/18 19:00   86    16  126/72  97


 


 12/11/18 18:45   86    18  114/66  97


 


 12/11/18 18:31   80    18  121/63  97


 


 12/11/18 18:16   86    18  133/78  97


 


 12/11/18 18:01  97.9 F  92    18  130/66  97


 


 12/11/18 14:06  97.5 F L   54 L   16  171/80  97








 Intake and Output











 12/11/18 12/12/18 12/12/18





 22:59 06:59 14:59


 


Intake Total 1101  220


 


Output Total 100 175 


 


Balance 1001 -175 220


 


Intake:   


 


  IV 1101  


 


  Oral   220


 


Output:   


 


  Urine  175 


 


  Estimated Blood Loss 100  


 


Other:   


 


  Weight 99.79 kg  

















Gen: This is an obese 68-year-old  male.  He is resting in bed appears 

to be comfortable and in no acute distress.


HEENT: Head is atraumatic, normocephalic. Pupils equal, round. Sclerae is 

anicteric.  Oral mucous membranes are moist.  No thrush noted.


NECK: Supple. No JVD. No lymphadenopathy. No thyromegaly. 


LUNGS: Clear to auscultation. No wheezes or rhonchi.  No intercostal 

retractions.


HEART: Regular rate and rhythm. No murmur. 


ABDOMEN: Soft. Bowel sounds are present. No masses.  No tenderness.


EXTREMITIES: No pedal edema.  No calf tenderness.  Large dressing in place to 

the right knee which was not removed.  One plus edema to the right leg.


NEUROLOGICAL: Patient is awake, alert and oriented x3. Cranial nerves 2 through 

12 are grossly intact. 








Results


Results: 





 Laboratory Results











WBC  13.1 k/uL (3.8-10.6)  H  12/12/18  07:40    


 


RBC  3.66 m/uL (4.30-5.90)  L  12/12/18  07:40    


 


Hgb  11.9 gm/dL (13.0-17.5)  L  12/12/18  07:40    


 


Hct  35.3 % (39.0-53.0)  L  12/12/18  07:40    


 


MCV  96.4 fL (80.0-100.0)   12/12/18  07:40    


 


MCH  32.7 pg (25.0-35.0)   12/12/18  07:40    


 


MCHC  33.9 g/dL (31.0-37.0)   12/12/18  07:40    


 


RDW  13.0 % (11.5-15.5)   12/12/18  07:40    


 


Plt Count  176 k/uL (150-450)   12/12/18  07:40    


 


Neutrophils %  81 %  12/12/18  07:40    


 


Lymphocytes %  8 %  12/12/18  07:40    


 


Monocytes %  6 %  12/12/18  07:40    


 


Eosinophils %  3 %  12/12/18  07:40    


 


Basophils %  0 %  12/12/18  07:40    


 


Neutrophils #  10.6 k/uL (1.3-7.7)  H  12/12/18  07:40    


 


Lymphocytes #  1.0 k/uL (1.0-4.8)   12/12/18  07:40    


 


Monocytes #  0.8 k/uL (0-1.0)   12/12/18  07:40    


 


Eosinophils #  0.4 k/uL (0-0.7)   12/12/18  07:40    


 


Basophils #  0.1 k/uL (0-0.2)   12/12/18  07:40    











CBC & Chem 7: 


 12/14/18 05:58





 12/14/18 05:58


Labs: 


 Abnormal Lab Results - Last 24 Hours (Table)











  12/12/18 Range/Units





  07:40 


 


WBC  13.1 H  (3.8-10.6)  k/uL


 


RBC  3.66 L  (4.30-5.90)  m/uL


 


Hgb  11.9 L  (13.0-17.5)  gm/dL


 


Hct  35.3 L  (39.0-53.0)  %


 


Neutrophils #  10.6 H  (1.3-7.7)  k/uL








 Microbiology - Last 24 Hours (Table)











 12/11/18 17:15 Gram Stain - Preliminary





 Knee - Right Wound Culture - Preliminary


 


 12/11/18 17:15 Gram Stain - Preliminary





 Knee - Right Wound Culture - Preliminary


 


 12/11/18 17:15 Anaerobic Culture - Preliminary





 Knee - Right 


 


 12/11/18 17:15 Anaerobic Culture - Preliminary





 Knee - Right 














Assessment and Plan


Plan: 





This is a 68-year-old  male who presented to the hospital with 

infected right total knee arthroplasty status post I&D with polyethylene 

exchange on December 11 and aspiration on December 10.  Wound culture from 

December 10 showing presumptive staph aureus.  Patient does not have history of 

MRSA.  He has received During the Perioperative period.  Vancomycin Ancef 

ordered.  Continue supportive care.  Further recommendations as patient 

progresses.





The above dictated assessment and findings were discussed with Dr. Alejo.  The 

impression and plan of care have been directed as dictated.  Katherine Samson nurse 

practitioner acting as scribe for Dr. Alejo.

## 2018-12-12 NOTE — P.PN
Subjective


Progress Note Date: 12/12/18


This is a 68-year-old male who is status post irrigation and debridement of 

right total knee arthroplasty with polyethylene exchange.  This is 

postoperative day #1.  Patient is seen and evaluated at bedside with Dr. Josue Morris.  Patient states that his pain is well controlled today. Patient 

denies any fever/chills, numbness, weakness, tingling, abdominal pain, 

shortness of breath or chest pain.








Objective





- Vital Signs


Vital signs: 


 Vital Signs











Temp  98.9 F   12/12/18 07:00


 


Pulse  88   12/12/18 07:00


 


Resp  18   12/12/18 07:00


 


BP  131/71   12/12/18 07:00


 


Pulse Ox  94 L  12/11/18 23:00








 Intake & Output











 12/11/18 12/12/18 12/12/18





 18:59 06:59 18:59


 


Intake Total 1151 350 220


 


Output Total 100 175 


 


Balance 1051 175 220


 


Weight 99.79 kg 99.79 kg 


 


Intake:   


 


  IV 1151 350 


 


  Oral   220


 


Output:   


 


  Urine  175 


 


  Estimated Blood Loss 100  














- Exam


Vital signs are stable.  Patient is in no acute distress and is alert and 

oriented 3.  Calf is soft and nontender to palpation.  Dressing is clean, dry, 

and intact.  Patient has full foot and ankle motion without pain or difficulty.

  Neurovascular status and circulatory status are intact.  








- Labs


CBC & Chem 7: 


 12/12/18 07:40





Labs: 


 Abnormal Lab Results - Last 24 Hours (Table)











  12/12/18 Range/Units





  07:40 


 


WBC  13.1 H  (3.8-10.6)  k/uL


 


RBC  3.66 L  (4.30-5.90)  m/uL


 


Hgb  11.9 L  (13.0-17.5)  gm/dL


 


Hct  35.3 L  (39.0-53.0)  %


 


Neutrophils #  10.6 H  (1.3-7.7)  k/uL








 Microbiology - Last 24 Hours (Table)











 12/11/18 17:15 Wound Culture - Preliminary





 Knee - Right 


 


 12/11/18 17:15 Anaerobic Culture - Preliminary





 Knee - Right 


 


 12/11/18 17:15 Wound Culture - Preliminary





 Knee - Right 


 


 12/11/18 17:15 Anaerobic Culture - Preliminary





 Knee - Right 














Assessment and Plan


Assessment: 


Hypertension


Status post irrigation and debridement right total knee arthroplasty with 

polyethylene exchange.





(1) S/P total knee arthroplasty


Current Visit: No   Status: Acute   Code(s): Z96.659 - PRESENCE OF UNSPECIFIED 

ARTIFICIAL KNEE JOINT   SNOMED Code(s): 5593868568180


   





(2) Infection of total right knee replacement


Current Visit: Yes   Status: Acute   Code(s): T84.53XA - INFECT/INFLM REACTION 

DUE TO INTERNAL R KNEE PROSTH, INIT   SNOMED Code(s): 688148777


   


Plan: 


1.  Continue routine postoperative care and pain control


2.  DVT prophylaxis with aspirin.


3.  Physical therapy today.


4.  Cultures are pending.  White blood cell count is decreasing.


5.  Appreciate input from internal medicine and infectious disease.


6.  Antibiotics per infectious disease.


7.  Anticipate discharge in the next 1-2 days.

## 2018-12-13 LAB
ANION GAP SERPL CALC-SCNC: 8 MMOL/L
BUN SERPL-SCNC: 12 MG/DL (ref 9–20)
CALCIUM SPEC-MCNC: 9.1 MG/DL (ref 8.4–10.2)
CHLORIDE SERPL-SCNC: 105 MMOL/L (ref 98–107)
CO2 SERPL-SCNC: 28 MMOL/L (ref 22–30)
GLUCOSE SERPL-MCNC: 101 MG/DL (ref 74–99)
POTASSIUM SERPL-SCNC: 3.6 MMOL/L (ref 3.5–5.1)
SODIUM SERPL-SCNC: 141 MMOL/L (ref 137–145)

## 2018-12-13 RX ADMIN — ASPIRIN 325 MG ORAL TABLET SCH MG: 325 PILL ORAL at 08:19

## 2018-12-13 RX ADMIN — DOXAZOSIN MESYLATE SCH MG: 2 TABLET ORAL at 20:55

## 2018-12-13 RX ADMIN — CEFAZOLIN SCH GM: 10 INJECTION, POWDER, FOR SOLUTION INTRAVENOUS at 17:06

## 2018-12-13 RX ADMIN — LISINOPRIL SCH MG: 20 TABLET ORAL at 08:18

## 2018-12-13 RX ADMIN — DOCUSATE SODIUM AND SENNOSIDES SCH EACH: 50; 8.6 TABLET ORAL at 20:56

## 2018-12-13 RX ADMIN — CEFAZOLIN SCH GM: 10 INJECTION, POWDER, FOR SOLUTION INTRAVENOUS at 09:38

## 2018-12-13 RX ADMIN — HYDROCODONE BITARTRATE AND ACETAMINOPHEN PRN EACH: 7.5; 325 TABLET ORAL at 14:40

## 2018-12-13 RX ADMIN — CEFAZOLIN SCH: 330 INJECTION, POWDER, FOR SOLUTION INTRAMUSCULAR; INTRAVENOUS at 15:13

## 2018-12-13 RX ADMIN — SODIUM CHLORIDE SCH MLS/HR: 9 INJECTION, SOLUTION INTRAVENOUS at 17:06

## 2018-12-13 RX ADMIN — HYDROCODONE BITARTRATE AND ACETAMINOPHEN PRN EACH: 7.5; 325 TABLET ORAL at 04:28

## 2018-12-13 RX ADMIN — CEFAZOLIN SCH GM: 10 INJECTION, POWDER, FOR SOLUTION INTRAVENOUS at 03:23

## 2018-12-13 RX ADMIN — HYDROCODONE BITARTRATE AND ACETAMINOPHEN PRN EACH: 7.5; 325 TABLET ORAL at 09:33

## 2018-12-13 RX ADMIN — DOXAZOSIN MESYLATE SCH MG: 2 TABLET ORAL at 08:23

## 2018-12-13 RX ADMIN — LISINOPRIL SCH MG: 20 TABLET ORAL at 20:55

## 2018-12-13 RX ADMIN — MELOXICAM SCH MG: 7.5 TABLET ORAL at 08:19

## 2018-12-13 RX ADMIN — HYDROCODONE BITARTRATE AND ACETAMINOPHEN PRN EACH: 7.5; 325 TABLET ORAL at 21:11

## 2018-12-13 RX ADMIN — ASPIRIN 325 MG ORAL TABLET SCH MG: 325 PILL ORAL at 20:55

## 2018-12-13 RX ADMIN — THERA TABS SCH EACH: TAB at 14:40

## 2018-12-13 RX ADMIN — SODIUM CHLORIDE SCH MLS/HR: 9 INJECTION, SOLUTION INTRAVENOUS at 04:28

## 2018-12-13 NOTE — P.CON
Consult Note





- .


Consult date: 12/12/18


Assessment/Plan:: 





This is a 68-year-old  male who recently underwent a right total knee 

arthroplasty on November 6 with Dr. Morris.  Patient states he did very well 

following the surgery and was progressing well with good range of motion.  He 

states that on Wednesday he noticed that he had significant pain in the knee 

which kept him up all night.  During the day he was not having this type of 

pain.  On the next day he felt moisture around the knee but not sure if it was 

coming from the wound.  He states he also had increased swelling and pain that 

continued to worsen.  Patient followed up with Dr. Morris and he underwent an 

aspiration on December 10 of synovial fluid which was orange and cloudy, RBCs 58

,500, WBCs 110,100, polynuclear cells 97, mononuclear 1, eosinophils 2.  

Specimen was negative for crystals.  At that time, his serum white count was 21

, sed rate 15 and CRP 27.  Patient was admitted to [facility] 

and yesterday underwent I&D with polyethylene exchange with Dr. Morris.  He 

found infected prepatellar space and knee but deep knee did not appear 

infected.  Patient states that his pain is improved since he underwent I&D.  He 

has received Kefzol in the perioperative period.  Patient denies any trauma or 

fall that would have injured his knee.  Please see the consult note as dictated 

by nurse practitioner  Katherine Samson.


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 

knee pain.  Constantly underwent a right total knee arthroplasty for the 

significant progressive degenerative joint disease.  The patient is very active 

and is looking forward to his recovery in [place] where they go to Winter.  He 

however developed as noted increasing pain and swelling to the right leg with 

some drainage.  As reassured evidence of purulent material and now is been 

taken the operating room for incision and drainage of polyethylene exchange of 

the right knee.  This is likely a staphylococcal infection.  The patient is now 

status post the drainage within proven to this pain but there is evidence of 

the significant infection to the joint.  The goal this point in time will be 

antimicrobial therapy.  The patient is instructed on the need for her to be 

intravenous to allow penetration into the joint space and bony structure.  

Vancomycin and cefazolin being utilized for now until the final culture is 

available regarding the susceptibilities.  This will drive the antibiotic 

choices.  PICC line will need to be placed.  Patient would like to take care of 

his antibiotics in the home setting.  He is extremely disappointed about 

inability to go and spend his winter in [place].  We discussed some potential 

options of how he medial without difficulty.  I agree with evaluation, 

assessment and plan as dictated by nurse practitioner Mrs. Katherine Samson.

## 2018-12-13 NOTE — PN
PROGRESS NOTE



DATE OF SERVICE:

December 13, 2018.



PRESENTING COMPLAINT:

Pain right knee.



INTERVAL HISTORY:

This is a patient with infected right knee arthroplasty status post I and D and poorly

_____ exchange.



Patient is tolerating a diet.  No fever.  No chills.  Is on IV antibiotics.



REVIEW OF SYSTEMS:

Done for constitutional, cardiovascular, GI, pulmonary; relevant findings as above.



CURRENT MEDICATIONS:

Reviewed that include IV Ancef and IV vancomycin.



EXAMINATION:

VITAL SIGNS: Afebrile, pulse 80,  respirations 16, blood pressure 134/79, pulse ox 94

percent room air.

GENERAL APPEARANCE: Sitting up in a chair, comfortable.

EYES:  Pupils are equal. Conjunctivae normal.

NECK: JVD not raised.  Mass not palpable.

RESPIRATORY effort normal.

LUNGS are clear.

CARDIOVASCULAR:  1st and 2nd sounds. No edema.

ABDOMEN:  Soft, nontender.  Liver and spleen not palpable.

PSYCHIATRY:  Alert and oriented times three. Mood and affect normal.

MUSCULOSKELETAL:  Right knee in a dressing.  Evidence of OA especially in the hands.



INVESTIGATIONS:

White count 13.1, hemoglobin 11.9, potassium 3.6, BUN and creatinine normal.  The

patient's Gram stain coming back showing Staph aureus.



ASSESSMENT:

1. Infected right total knee arthroplasty, status post I and D with _____ exchange

    with cultures growing Staph aureus.

2. Essential hypertension.

3. Primary osteoarthritis.



PLAN:

Continue with IV antibiotics.  Await culture results.  Discussed with the patient and

wife that will determine the further course of action.  Questions were answered.



Thank you Dr. Morris.





MMFELIPEL / DEBORAHN: [id number] / Job#: [id number]

## 2018-12-13 NOTE — P.PN
Subjective


Progress Note Date: 12/13/18


This is a 68-year-old male who is status post irrigation and debridement of 

right total knee arthroplasty with polyethylene exchange.  This is 

postoperative day #2.  Patient is seen and evaluated at bedside with Dr. Josue Morris.  Patient states that his pain is well controlled today and he denies 

any new complaints. Patient denies any fever/chills, numbness, weakness, 

tingling, abdominal pain, shortness of breath or chest pain.








Objective





- Vital Signs


Vital signs: 


 Vital Signs











Temp  97.9 F   12/13/18 07:00


 


Pulse  78   12/13/18 07:00


 


Resp  16   12/13/18 07:00


 


BP  153/77   12/13/18 07:00


 


Pulse Ox  97   12/13/18 07:00








 Intake & Output











 12/12/18 12/13/18 12/13/18





 18:59 06:59 18:59


 


Intake Total 560  


 


Output Total 750 600 


 


Balance -190 -600 


 


Intake:   


 


  Oral 560  


 


Output:   


 


  Urine 750 600 


 


Other:   


 


  # Voids  1 














- Exam


Vital signs are stable.  Patient is in no acute distress and is alert and 

oriented 3.  Calf is soft and nontender to palpation.  Dressing is intact with 

mild drainage noted.  Patient has full foot and ankle motion without pain or 

difficulty.  Neurovascular status and circulatory status are intact.  








- Labs


CBC & Chem 7: 


 12/12/18 07:40





 12/13/18 06:38


Labs: 


 Abnormal Lab Results - Last 24 Hours (Table)











  12/12/18 12/13/18 Range/Units





  16:11 06:38 


 


Sodium  136 L   (137-145)  mmol/L


 


Glucose  115 H  101 H  (74-99)  mg/dL








 Microbiology - Last 24 Hours (Table)











 12/11/18 17:15 Gram Stain - Preliminary





 Knee - Right Wound Culture - Preliminary





    Presumptive Staph aureus


 


 12/11/18 17:15 Gram Stain - Preliminary





 Knee - Right Wound Culture - Preliminary














Assessment and Plan


Assessment: 


Hypertension


Status post irrigation and debridement right total knee arthroplasty with 

polyethylene exchange.





(1) S/P total knee arthroplasty


Current Visit: No   Status: Acute   Code(s): Z96.659 - PRESENCE OF UNSPECIFIED 

ARTIFICIAL KNEE JOINT   SNOMED Code(s): 2278665468468


   





(2) Infection of total right knee replacement


Current Visit: Yes   Status: Acute   Code(s): T84.53XA - INFECT/INFLM REACTION 

DUE TO INTERNAL R KNEE PROSTH, INIT   SNOMED Code(s): 691834533


   


Plan: 


1.  Continue routine postoperative care and pain control.


2.  DVT prophylaxis with aspirin.


3.  Physical therapy today.


4.  Final cultures are pending. Preliminary cultures show presumptive staph 

aureus.


5.  Appreciate input from internal medicine and infectious disease.


6.  Antibiotics per infectious disease. Patient is awaiting PICC line placement.


7.  Anticipate discharge home in the next 1-2 days.

## 2018-12-14 VITALS
RESPIRATION RATE: 16 BRPM | HEART RATE: 76 BPM | DIASTOLIC BLOOD PRESSURE: 64 MMHG | TEMPERATURE: 98.2 F | SYSTOLIC BLOOD PRESSURE: 136 MMHG

## 2018-12-14 LAB
ANION GAP SERPL CALC-SCNC: 7 MMOL/L
BASOPHILS # BLD AUTO: 0 K/UL (ref 0–0.2)
BASOPHILS NFR BLD AUTO: 0 %
BUN SERPL-SCNC: 11 MG/DL (ref 9–20)
CALCIUM SPEC-MCNC: 9 MG/DL (ref 8.4–10.2)
CHLORIDE SERPL-SCNC: 104 MMOL/L (ref 98–107)
CO2 SERPL-SCNC: 29 MMOL/L (ref 22–30)
EOSINOPHIL # BLD AUTO: 0.5 K/UL (ref 0–0.7)
EOSINOPHIL NFR BLD AUTO: 5 %
ERYTHROCYTE [DISTWIDTH] IN BLOOD BY AUTOMATED COUNT: 3.67 M/UL (ref 4.3–5.9)
ERYTHROCYTE [DISTWIDTH] IN BLOOD: 12.9 % (ref 11.5–15.5)
GLUCOSE SERPL-MCNC: 101 MG/DL (ref 74–99)
HCT VFR BLD AUTO: 34.9 % (ref 39–53)
HGB BLD-MCNC: 11.7 GM/DL (ref 13–17.5)
INR PPP: 0.9 (ref ?–1.2)
LYMPHOCYTES # SPEC AUTO: 1.1 K/UL (ref 1–4.8)
LYMPHOCYTES NFR SPEC AUTO: 13 %
MCH RBC QN AUTO: 32 PG (ref 25–35)
MCHC RBC AUTO-ENTMCNC: 33.7 G/DL (ref 31–37)
MCV RBC AUTO: 95 FL (ref 80–100)
MONOCYTES # BLD AUTO: 0.6 K/UL (ref 0–1)
MONOCYTES NFR BLD AUTO: 7 %
NEUTROPHILS # BLD AUTO: 6.3 K/UL (ref 1.3–7.7)
NEUTROPHILS NFR BLD AUTO: 73 %
PLATELET # BLD AUTO: 209 K/UL (ref 150–450)
POTASSIUM SERPL-SCNC: 3.8 MMOL/L (ref 3.5–5.1)
PT BLD: 10.2 SEC (ref 9–12)
SODIUM SERPL-SCNC: 140 MMOL/L (ref 137–145)
WBC # BLD AUTO: 8.6 K/UL (ref 3.8–10.6)

## 2018-12-14 PROCEDURE — 02HV33Z INSERTION OF INFUSION DEVICE INTO SUPERIOR VENA CAVA, PERCUTANEOUS APPROACH: ICD-10-PCS

## 2018-12-14 RX ADMIN — HYDROCODONE BITARTRATE AND ACETAMINOPHEN PRN EACH: 7.5; 325 TABLET ORAL at 05:35

## 2018-12-14 RX ADMIN — SODIUM CHLORIDE SCH MLS/HR: 9 INJECTION, SOLUTION INTRAVENOUS at 05:35

## 2018-12-14 RX ADMIN — CEFAZOLIN SCH GM: 10 INJECTION, POWDER, FOR SOLUTION INTRAVENOUS at 09:16

## 2018-12-14 RX ADMIN — CEFAZOLIN SCH GM: 10 INJECTION, POWDER, FOR SOLUTION INTRAVENOUS at 01:07

## 2018-12-14 RX ADMIN — DOXAZOSIN MESYLATE SCH MG: 2 TABLET ORAL at 08:12

## 2018-12-14 RX ADMIN — HYDROCODONE BITARTRATE AND ACETAMINOPHEN PRN EACH: 7.5; 325 TABLET ORAL at 14:12

## 2018-12-14 RX ADMIN — CEFAZOLIN SCH: 330 INJECTION, POWDER, FOR SOLUTION INTRAMUSCULAR; INTRAVENOUS at 06:07

## 2018-12-14 RX ADMIN — THERA TABS SCH EACH: TAB at 08:13

## 2018-12-14 RX ADMIN — LISINOPRIL SCH MG: 20 TABLET ORAL at 08:12

## 2018-12-14 RX ADMIN — ASPIRIN 325 MG ORAL TABLET SCH MG: 325 PILL ORAL at 08:12

## 2018-12-14 RX ADMIN — MELOXICAM SCH MG: 7.5 TABLET ORAL at 08:13

## 2018-12-14 NOTE — P.PN
Subjective


Progress Note Date: 12/13/18





This is a 68-year-old  male who recently underwent a right total knee 

arthroplasty on November 6 with Dr. Morris.  Patient states he did very well 

following the surgery and was progressing well with good range of motion.  He 

states that on Wednesday he noticed that he had significant pain in the knee 

which kept him up all night.  During the day he was not having this type of 

pain.  On the next day he felt moisture around the knee but not sure if it was 

coming from the wound.  He states he also had increased swelling and pain that 

continued to worsen.  Patient followed up with Dr. Morris and he underwent an 

aspiration on December 10 of synovial fluid which was orange and cloudy, RBCs 58

,500, WBCs 110,100, polynuclear cells 97, mononuclear 1, eosinophils 2.  

Specimen was negative for crystals.  At that time, his serum white count was 21

, sed rate 15 and CRP 27.  Patient was admitted to [facility] 

and yesterday underwent I&D with polyethylene exchange with Dr. Morris.  He 

found infected prepatellar space and knee but deep knee did not appear 

infected.  Patient states that his pain is improved since he underwent I&D.  He 

has received Kefzol in the perioperative period.  Patient denies any trauma or 

fall that would have injured his knee. 12/13/2018 patient anxious about PICC 

still angry about the infection





Objective





- Vital Signs


Vital signs: 


 Vital Signs











Temp  98.1 F   12/14/18 00:23


 


Pulse  96   12/14/18 00:23


 


Resp  16   12/14/18 00:23


 


BP  141/79   12/14/18 00:23


 


Pulse Ox  94 L  12/13/18 15:38








 Intake & Output











 12/13/18 12/13/18 12/14/18





 06:59 18:59 06:59


 


Intake Total  740 325


 


Output Total 600  


 


Balance -600 740 325


 


Intake:   


 


  Intake, IV Titration  520 325





  Amount   


 


    Sodium Chloride 0.9% 1,  520 325





    000 ml @ 65 mls/hr IV .   





    [id number] DESIRE Rx#:[id number]   


 


  Oral  220 


 


Output:   


 


  Urine 600  


 


Other:   


 


  # Voids 1 3 














- Exam





Gen: This is an obese 68-year-old  male.  He is resting in bed appears 

to be comfortable and in no acute distress.


HEENT: Head is atraumatic, normocephalic. Pupils equal, round. Sclerae is 

anicteric.  Oral mucous membranes are moist.  No thrush noted.


NECK: Supple. No JVD. No lymphadenopathy. No thyromegaly. 


LUNGS: Clear to auscultation. No wheezes or rhonchi.  No intercostal 

retractions.


HEART: Regular rate and rhythm. No murmur. 


ABDOMEN: Soft. Bowel sounds are present. No masses.  No tenderness.


EXTREMITIES: No pedal edema.  No calf tenderness.  Large dressing in place to 

the right knee which was not removed.  One plus edema to the right leg.


NEUROLOGICAL: Patient is awake, alert and oriented x3. Cranial nerves 2 through 

12 are grossly intact. 











- Labs


CBC & Chem 7: 


 12/12/18 07:40





 12/13/18 06:38


Labs: 


 Abnormal Lab Results - Last 24 Hours (Table)











  12/13/18 Range/Units





  06:38 


 


Glucose  101 H  (74-99)  mg/dL








 Microbiology - Last 24 Hours (Table)











 12/11/18 17:15 Anaerobic Culture - Preliminary





 Knee - Right 


 


 12/11/18 17:15 Anaerobic Culture - Preliminary





 Knee - Right 


 


 12/11/18 17:15 Gram Stain - Final





 Knee - Right Wound Culture - Final





    Staphylococcus aureus


 


 12/11/18 17:15 Gram Stain - Final





 Knee - Right Wound Culture - Final








 Laboratory Results











WBC  13.1 k/uL (3.8-10.6)  H  12/12/18  07:40    


 


RBC  3.66 m/uL (4.30-5.90)  L  12/12/18  07:40    


 


Hgb  11.9 gm/dL (13.0-17.5)  L  12/12/18  07:40    


 


Hct  35.3 % (39.0-53.0)  L  12/12/18  07:40    


 


MCV  96.4 fL (80.0-100.0)   12/12/18  07:40    


 


MCH  32.7 pg (25.0-35.0)   12/12/18  07:40    


 


MCHC  33.9 g/dL (31.0-37.0)   12/12/18  07:40    


 


RDW  13.0 % (11.5-15.5)   12/12/18  07:40    


 


Plt Count  176 k/uL (150-450)   12/12/18  07:40    


 


Neutrophils %  81 %  12/12/18  07:40    


 


Lymphocytes %  8 %  12/12/18  07:40    


 


Monocytes %  6 %  12/12/18  07:40    


 


Eosinophils %  3 %  12/12/18  07:40    


 


Basophils %  0 %  12/12/18  07:40    


 


Neutrophils #  10.6 k/uL (1.3-7.7)  H  12/12/18  07:40    


 


Lymphocytes #  1.0 k/uL (1.0-4.8)   12/12/18  07:40    


 


Monocytes #  0.8 k/uL (0-1.0)   12/12/18  07:40    


 


Eosinophils #  0.4 k/uL (0-0.7)   12/12/18  07:40    


 


Basophils #  0.1 k/uL (0-0.2)   12/12/18  07:40    


 


Sodium  141 mmol/L (137-145)   12/13/18  06:38    


 


Potassium  3.6 mmol/L (3.5-5.1)   12/13/18  06:38    


 


Chloride  105 mmol/L ()   12/13/18  06:38    


 


Carbon Dioxide  28 mmol/L (22-30)   12/13/18  06:38    


 


Anion Gap  8 mmol/L  12/13/18  06:38    


 


BUN  12 mg/dL (9-20)   12/13/18  06:38    


 


Creatinine  0.85 mg/dL (0.66-1.25)   12/13/18  06:38    


 


Est GFR (CKD-EPI)AfAm  >90  (>60 ml/min/1.73 sqM)   12/13/18  06:38    


 


Est GFR (CKD-EPI)NonAf  90  (>60 ml/min/1.73 sqM)   12/13/18  06:38    


 


Glucose  101 mg/dL (74-99)  H  12/13/18  06:38    


 


Calcium  9.1 mg/dL (8.4-10.2)   12/13/18  06:38    








 Microbiology





12/11/18 17:15   Knee - Right   Anaerobic Culture - Preliminary


12/11/18 17:15   Knee - Right   Anaerobic Culture - Preliminary


12/11/18 17:15   Knee - Right   Gram Stain - Final


12/11/18 17:15   Knee - Right   Wound Culture - Final


                            Staphylococcus aureus


12/11/18 17:15   Knee - Right   Gram Stain - Final


12/11/18 17:15   Knee - Right   Wound Culture - Final











Assessment and Plan


(1) Infection of total right knee replacement


Narrative/Plan: 


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 

knee pain.  Constantly underwent a right total knee arthroplasty for the 

significant progressive degenerative joint disease.  The patient is very active 

and is looking forward to his recovery in [place] where they go to Winter.  He 

however developed as noted increasing pain and swelling to the right leg with 

some drainage.  As reassured evidence of purulent material and now is been 

taken the operating room for incision and drainage of polyethylene exchange of 

the right knee.  This is likely a staphylococcal infection.  The patient is now 

status post the drainage within proven to this pain but there is evidence of 

the significant infection to the joint.  The goal this point in time will be 

antimicrobial therapy.  The patient is instructed on the need for her to be 

intravenous to allow penetration into the joint space and bony structure.  

Vancomycin and cefazolin being utilized for now until the final culture is 

available regarding the susceptibilities.  This will drive the antibiotic 

choices.  PICC line will need to be placed.  Patient would like to take care of 

his antibiotics in the home setting.  He is extremely disappointed about 

inability to go and spend his winter in [place].  


12/13/2018 improved will arrange PICC await final culture for home IV or if 

going to office due to cost


Current Visit: Yes   Status: Acute   Code(s): T84.53XA - INFECT/INFLM REACTION 

DUE TO INTERNAL R KNEE PROSTH, INIT   SNOMED Code(s): 017256915

## 2018-12-14 NOTE — US
EXAMINATION TYPE: US venous doppler duplex LE RT

 

DATE OF EXAM: 12/14/2018 2:05 PM

 

COMPARISON: NONE

 

CLINICAL HISTORY: swelling. Right knee replacement surgery 

 

SIDE PERFORMED: Right  

 

TECHNIQUE:  The lower extremity deep venous system is examined utilizing real time linear array sonog
alisha with graded compression, doppler sonography and color-flow sonography.

 

VESSELS IMAGED:

External Iliac Vein (EIV)

Common Femoral Vein

Deep Femoral Vein

Greater Saphenous Vein *

Femoral Vein

Popliteal Vein

Small Saphenous Vein *

Proximal Calf Veins

(* superficial vessels)

 

 

 

Right Leg:  Negative for DVT

 

 

 

 

 

IMPRESSION:

1. Right lower extremity negative for deep venous thrombosis

## 2018-12-14 NOTE — IR
PICC LINE PLACEMENT:

 

HISTORY:  Infection requiring long-term antibiotic therapy

 

PROCEDURE:  Ultrasound and fluoroscopic guidance of PICC line placement.

 

COMPLICATIONS:  None

 

ANESTHESIA:  1. 1% Lidocaine locally.

 

FINDINGS/TECHNIQUE:  The procedure was explained to the patient.  The risks, complications, benefits 
and alternatives were discussed and any questions were answered.  Informed consent was obtained.  The
 patient was placed supine on the fluoroscopic table and prepped and draped in the usual sterile fash
ion.  Utilizing a  21 gauge needle and sonographic and fluoroscopic guidance, access in the left basi
lic vein vein was achieved and there is placement of a 0.018 guidewire.  The vein is patent.  A 4-F s
ricki was placed over the guidewire.  The guidewire and dilator were removed and a 4-F. PICC line was
 placed through the sheath with the tip at the level of the SVC.  The sheath was removed, the cathete
r was flushed and sutured into position.  The patient was stable throughout the procedure and remaine
d stable upon discharge from the Department of Radiology. 

 

The vein puncture was patent under ultrasound. A gray scale image was obtained to document patency of
 the vein punctured.

 

All elements of the maximal barrier technique were utilized.

 

FLUOROSCOPY TIME:  0.4 minutes and one image submitted

 

IMPRESSION: 

Successful PICC line placement under ultrasound and fluoroscopic guidance.

## 2018-12-14 NOTE — P.DS
Providers


Date of admission: 


12/11/18 16:47





Expected date of discharge: 12/14/18


Attending physician: 


Josue Morris





Consults: 





 





12/11/18 16:27


Consult Physician Routine 


   Consulting Provider: Celestino Jack


   Consult Reason/Comments: medical management


   Do you want consulting provider notified?: Yes





12/11/18 16:30


Consult Physician Routine 


   Consulting Provider: Palmer Alejo


   Consult Reason/Comments: infected right total knee


   Do you want consulting provider notified?: Yes











Primary care physician: 


Gilbert Enciso








- Discharge Diagnosis(es)


(1) S/P total knee arthroplasty


Current Visit: No   Status: Acute   





(2) Infection of total right knee replacement


Current Visit: Yes   Status: Acute   


Hospital Course: 


This is a 68-year-old male who underwent a right total knee arthroplasty on 11/6 /2018.  The patient presented as an outpatient for increased pain and swelling 

in the right knee on 12/10/2018. The right knee was aspirated revealing 

infection.  After discussion and consideration patient elects to proceed with 

irrigation and debridement of right total knee arthroplasty with polyethylene 

exchange.  The patient is seen preoperatively by Dr. Morris.





Patient is admitted to [facility] on 12/11/2018 for irrigation and 

debridement of right total knee arthroplasty with polyethylene exchange.  The 

procedures performed without complication or sequelae.  There was evidence for 

infection of the prepatellar space of the right knee. There was no evidence for 

deep right knee infection.  Cultures are positive for staph aureus.  Patient 

received a PICC line and IV antibiotics are being managed by infectious 

disease.  The patient is doing well postoperatively.  Labs and vital signs are 

stable on day of discharge.  Patient refused homecare physical therapy and CPM.





On day of discharge patient's knee incision is healing well.  There is minimal 

erythema.  There is minimal drainage noted at this time. Surgical clips are 

intact.  There is minimal soft tissue swelling to the knee.  Patient has full 

foot and ankle motion without difficulty or pain.  Neurovascular status to the 

right lower extremity is intact.  Patient is discharged home in good condition. 

Please see med rec for accurate list of home medications.








Plan - Discharge Summary


Discharge Rx Participant: No


New Discharge Prescriptions: 


New


   Aspirin 325 mg PO BID #60 tab


   HYDROcodone/APAP 7.5-325MG [Norco 7.5-325] 1 - 2 tab PO Q4-6H PRN #84 tab


     PRN Reason: Pain


   Sennosides [Senokot] 1 tab PO BID #60 tablet





No Action


   amLODIPine [Norvasc] 10 mg PO QAM


   Ramipril [Altace] 10 mg PO BID


   Naproxen 500 mg PO BID


   Hydrochlorothiazide 12.5 mg PO QAM


   Doxazosin [Cardura] 2 mg PO BID


   Aspirin [Adult Low Dose Aspirin EC] 81 mg PO DAILY


   Aspirin 325 mg PO BID #60 tab


   HYDROcodone/APAP 7.5-325MG [Norco 7.5-325] 1 - 2 tab PO Q4-6H PRN #84 tab


     PRN Reason: Pain


   Sennosides [Senokot] 8.6 mg PO BID


Discharge Medication List





Aspirin [Adult Low Dose Aspirin EC] 81 mg PO DAILY 10/30/18 [History]


Doxazosin [Cardura] 2 mg PO BID 10/30/18 [History]


Hydrochlorothiazide 12.5 mg PO QAM 10/30/18 [History]


Naproxen 500 mg PO BID 10/30/18 [History]


Ramipril [Altace] 10 mg PO BID 10/30/18 [History]


amLODIPine [Norvasc] 10 mg PO QAM 10/30/18 [History]


Aspirin 325 mg PO BID #60 tab 11/07/18 [Rx]


HYDROcodone/APAP 7.5-325MG [Norco 7.5-325] 1 - 2 tab PO Q4-6H PRN #84 tab 11/08/ 18 [Rx]


Sennosides [Senokot] 8.6 mg PO BID 12/11/18 [History]


Aspirin 325 mg PO BID #60 tab 12/14/18 [Rx]


HYDROcodone/APAP 7.5-325MG [Norco 7.5-325] 1 - 2 tab PO Q4-6H PRN #84 tab 12/14/ 18 [Rx]


Sennosides [Senokot] 1 tab PO BID #60 tablet 12/14/18 [Rx]








Follow up Appointment(s)/Referral(s): 


Josue Morris DO [Doctor of Osteopathic Medicine] - 2 Weeks


Activity/Diet/Wound Care/Special Instructions: 


Weightbearing as tolerated with a walker.


Daily dressing changes, keep incision clean and dry.


May shower if no drainage from incision.


Antibiotics per infectious disease.


Please follow up with [facility] and call with questions or concerns 

[phone].


Discharge Disposition: HOME WITH HOME HEALTH SERVICES

## 2018-12-15 NOTE — PN
PROGRESS NOTE



DATE OF SERVICE:

12/14/2018



PRESENTING COMPLAINT:

Right knee surgery.



INTERVAL HISTORY:

Patient is seen by me on 12/14/2018, status post right knee arthroplasty, status post I

and D.  Overall feeling better.  Some pain is present.  No new issues. Did work with

therapy.  No fever, no chills.



REVIEW OF SYSTEMS:

Done for constitutional, cardiovascular, GI, pulmonary, musculoskeletal and findings as

above.



CURRENT MEDICATIONS:

Reviewed.



PHYSICAL EXAMINATION:

Temperature 98.3, pulse 71, respiratory rate 18, blood pressure 140/77, pulse ox 94% on

room air.

GENERAL APPEARANCE:  Sitting up, comfortable.

EYES: Pupils equal. Conjunctivae normal.

NECK: JVD not raised.  Mass not palpable.

Respiratory effort normal.

LUNGS: Clear.

CARDIOVASCULAR: First and second sounds. No edema.

ABDOMEN: Soft, nontender.  Liver and spleen not palpable.

PSYCHIATRY: Alert and oriented x3. Mood and affect normal.



INVESTIGATIONS:

White count 8.6, hemoglobin 11.7, potassium 3.8. Doppler ultrasound negative for DVT.



ASSESSMENT:

1. Infected right total knee arthroplasty, status post I and D with cultures growing

    MSSA.

2. Essential hypertension.

3. Primary osteoarthritis.

4. Acute postoperative blood loss anemia as expected from surgery.  Hemoglobin dropped

    from 13.9 to 11.7.



PLAN:

Continue current medication and treatment plan.  Antibiotics are being determined by

Dr. Alejo.  Looks like patient will be going home on IV ceftriaxone.





MMODL / IJN: [id number] / Job#: [id number]

## 2019-03-06 ENCOUNTER — HOSPITAL ENCOUNTER (OUTPATIENT)
Dept: HOSPITAL 90 - RAH | Age: 69
Discharge: HOME | End: 2019-03-06
Attending: NEUROLOGICAL SURGERY
Payer: MEDICARE

## 2019-03-06 DIAGNOSIS — M47.816: Primary | ICD-10-CM

## 2019-03-06 DIAGNOSIS — M48.061: ICD-10-CM

## 2019-03-06 PROCEDURE — 72100 X-RAY EXAM L-S SPINE 2/3 VWS: CPT

## 2020-03-12 ENCOUNTER — HOSPITAL ENCOUNTER (OUTPATIENT)
Dept: HOSPITAL 90 - RAH | Age: 70
Discharge: HOME | End: 2020-03-12
Attending: NEUROLOGICAL SURGERY
Payer: MEDICARE

## 2020-03-12 DIAGNOSIS — M48.07: Primary | ICD-10-CM

## 2020-03-12 DIAGNOSIS — M25.78: ICD-10-CM

## 2020-03-12 DIAGNOSIS — M43.16: ICD-10-CM

## 2020-03-12 PROCEDURE — 72131 CT LUMBAR SPINE W/O DYE: CPT
